# Patient Record
Sex: FEMALE | Race: WHITE
[De-identification: names, ages, dates, MRNs, and addresses within clinical notes are randomized per-mention and may not be internally consistent; named-entity substitution may affect disease eponyms.]

---

## 2020-09-01 ENCOUNTER — HOSPITAL ENCOUNTER (EMERGENCY)
Dept: HOSPITAL 97 - ER | Age: 55
Discharge: HOME | End: 2020-09-01
Payer: SELF-PAY

## 2020-09-01 DIAGNOSIS — I10: ICD-10-CM

## 2020-09-01 DIAGNOSIS — K08.89: Primary | ICD-10-CM

## 2020-09-01 DIAGNOSIS — F17.210: ICD-10-CM

## 2020-09-01 DIAGNOSIS — Z88.2: ICD-10-CM

## 2020-09-01 DIAGNOSIS — E03.9: ICD-10-CM

## 2020-09-01 DIAGNOSIS — Z88.0: ICD-10-CM

## 2020-09-01 LAB
BUN BLD-MCNC: 11 MG/DL (ref 7–18)
GLUCOSE SERPLBLD-MCNC: 112 MG/DL (ref 74–106)
HCT VFR BLD CALC: 31.4 % (ref 36–45)
LYMPHOCYTES # SPEC AUTO: 2 K/UL (ref 0.7–4.9)
PMV BLD: 7.8 FL (ref 7.6–11.3)
POTASSIUM SERPL-SCNC: 3.6 MMOL/L (ref 3.5–5.1)
RBC # BLD: 3.84 M/UL (ref 3.86–4.86)

## 2020-09-01 PROCEDURE — 96361 HYDRATE IV INFUSION ADD-ON: CPT

## 2020-09-01 PROCEDURE — 96375 TX/PRO/DX INJ NEW DRUG ADDON: CPT

## 2020-09-01 PROCEDURE — 80048 BASIC METABOLIC PNL TOTAL CA: CPT

## 2020-09-01 PROCEDURE — 99284 EMERGENCY DEPT VISIT MOD MDM: CPT

## 2020-09-01 PROCEDURE — 85025 COMPLETE CBC W/AUTO DIFF WBC: CPT

## 2020-09-01 PROCEDURE — 96374 THER/PROPH/DIAG INJ IV PUSH: CPT

## 2020-09-01 PROCEDURE — 36415 COLL VENOUS BLD VENIPUNCTURE: CPT

## 2020-09-01 NOTE — EDPHYS
Physician Documentation                                                                           

 [facility]                                                                 

Name: Leanne Canseco                                                                               

Age: 55 yrs                                                                                       

Sex: Female                                                                                       

: 1965                                                                                   

MRN: [id number]                                                                                   

Arrival Date: 2020                                                                          

Time: 01:12                                                                                       

Account#: [id number]                                                                            

Bed 13                                                                                            

Private MD:                                                                                       

ED Physician Brody Landry                                                                             

HPI:                                                                                              

                                                                                             

01:37 This 55 yrs old Female presents to ER via Ambulatory with complaints of Toothache,      pkl 

      Altered Mental Status.                                                                      

01:37 The patient presents with pain, swelling. Onset: The symptoms/episode began/occurred 2  pkl 

      day(s) ago. Patient had dental extraction 4 days ago. Given Tylenol #3 and Clindamycin.     

      Now out of pain medication.                                                                 

                                                                                                  

OB/GYN:                                                                                           

01:25 LMP N/A -                                                                               mt2 

                                                                                                  

Historical:                                                                                       

- Allergies:                                                                                      

01:23 Penicillins;                                                                            mt2 

01:23 Sulfa (Sulfonamide Antibiotics);                                                        mt2 

- Home Meds:                                                                                      

01:49 lisinopril 20 mg oral tab for Hypertension [Active]; propranolol 10 mg Oral tab 1 tab 3 mt2 

      times per day for Hypertension [Active]; levothyroxine 75 mcg tab 1 tab once daily for      

      Hypothyroidism [Active];                                                                    

- PMHx:                                                                                           

01:49 Hypertension; Hypothyroidism;                                                           mt2 

                                                                                                  

- Immunization history:: Adult Immunizations up to date.                                          

- Social history:: Smoking status: Patient reports the use of cigarette tobacco                   

  products, denies chronic smoking, but will smoke occasionally, Patient/guardian                 

  denies using alcohol, street drugs.                                                             

                                                                                                  

                                                                                                  

ROS:                                                                                              

01:37 Eyes: Negative for injury, pain, redness, and discharge.                                pkl 

01:37 ENT: Positive for dental pain.                                                              

01:37 Neck: Negative for stiffness.                                                               

01:37 Cardiovascular: Negative for chest pain.                                                    

01:37 Respiratory: Negative for cough, shortness of breath.                                       

01:37 Abdomen/GI: Negative for abdominal pain, nausea, vomiting, and diarrhea.                    

01:37 Back: Negative for acute changes.                                                           

01:37 : Negative for urinary symptoms.                                                          

01:37 MS/extremity: Negative for acute changes.                                                   

01:37 Skin: Negative for rash.                                                                    

01:37 Neuro: Negative for loss of consciousness.                                                  

                                                                                                  

Exam:                                                                                             

01:37 Eyes:  Pupils equal round and reactive to light, extra-ocular motions intact.  Lids and pkl 

      lashes normal.  Conjunctiva and sclera are non-icteric and not injected.  Cornea within     

      normal limits.  Periorbital areas with no swelling, redness, or edema.                      

01:37 Head/face: Noted is swelling, that is mild, of the  left  jaw.                              

01:37 Neck: Exam negative for nuchal rigidity.                                                    

01:37 Chest/axilla: Exam negative for acute changes.                                              

01:37 Cardiovascular: Rate: tachycardic, actual rate is  111 bpm, Rhythm: regular.                

01:37 Respiratory: the patient does not display signs of respiratory distress,  Respirations:     

      normal, Breath sounds: are clear throughout.                                                

01:37 Abdomen/GI: Exam negative for acute changes.                                                

01:37 Back: Exam negative for acute changes.                                                      

01:37 : Exam negative for acute changes.                                                        

01:37 Musculoskeletal/extremity: Exam is negative for acute changes.                              

01:37 Skin: Exam negative for rash.                                                               

01:37 Neuro: Orientation: is normal, Mentation: is normal, Cranial nerves: grossly normal,        

      Motor: is normal.                                                                           

                                                                                                  

Vital Signs:                                                                                      

01:19  / 134; Pulse 111; Resp 19; Temp 98.5; Pulse Ox 99% ; Weight 63.5 kg; Pain 10/10; mt2 

01:50  / 86; Pulse 96; Resp 16; Pulse Ox 100% on R/A; Pain 10/10;                       mt2 

02:22  / 86; Pulse 82; Resp 16; Pulse Ox 97% on R/A; Pain 5/10;                         mt2 

                                                                                                  

MDM:                                                                                              

01:16 Patient medically screened.                                                             pkl 

02:33 Data reviewed: vital signs, nurses notes, lab test result(s). ED course: Discussed lab. pkl 

      results with patient. Advised to follow up with Dentist tomorrow. Continue Clindamycin.     

      Patient understood instruction.                                                             

                                                                                                  

                                                                                             

01:34 Order name: CBC with Diff; Complete Time: 02:27                                         pkl 

                                                                                             

01:34 Order name: Chem 7; Complete Time: 02:27                                                pkl 

                                                                                                  

Administered Medications:                                                                         

01:36 Not Given (Patient Refused): Lopressor 5 mg IVP once; Hold for SBP <100 or HR <60.      pkl 

01:46 Drug: morphine 4 mg Route: IVP; Site: right antecubital;                                mt2 

02:05 Follow up: Response: No adverse reaction; Pain is decreased                             mt2 

01:46 Drug: Zofran (Ondansetron) 4 mg Route: IVP; Site: right antecubital;                    mt2 

02:04 Follow up: Response: No adverse reaction; Nausea is decreased                           mt2 

01:55 Drug: NS 0.9% 1000 ml Route: IV; Rate: 125 ml/hr; Site: right antecubital;              mt2 

02:51 Follow up: IV Status: Completed infusion; IV Intake: 400ml                              mt2 

02:40 Drug: morphine 4 mg Route: IVP; Site: right antecubital;                                mt2 

02:50 Follow up: Response: No adverse reaction; Pain is decreased                             mt2 

02:40 Drug: Viscous Lidocaine Liquid (4 %) 5 ml Route: Mucous Membrane;                       mt2 

02:50 Follow up: Response: No adverse reaction; Medication administered at discharge.         mt2 

                                                                                                  

                                                                                                  

Disposition:                                                                                      

20 02:37 Discharged to Home. Impression: Dental pain. S/P tooth extraction. Possible dry    

  socket.                                                                                         

- Condition is Stable.                                                                            

                                                                                                  

- Prescriptions for Tylenol- Codeine #3 300-30 mg Oral Tablet - take 1 tablet by ORAL             

  route every 8 hours As needed; 12 tablet.                                                       

- Medication Reconciliation Form, Thank You Letter, Antibiotic Education, Prescription            

  Opioid Use, Work release form form.                                                             

- Follow up: Private Physician; When: Tomorrow; Reason: Re-evaluation by your physician.          

- Problem is new.                                                                                 

- Symptoms have improved.                                                                         

                                                                                                  

                                                                                                  

                                                                                                  

Signatures:                                                                                       

Dispatcher [employer]                           EDMS                                                 

Brody Landry MD MD   pkDia Mario RN                    RN   mt2                                                  

                                                                                                  

Corrections: (The following items were deleted from the chart)                                    

02:52 02:37 2020 02:37 Discharged to Home. Impression: Dental pain. S/P tooth           mt2 

      extraction. Possible dry socket. Condition is Stable. Forms are Medication                  

      Reconciliation Form, Thank You Letter, Antibiotic Education, Prescription Opioid Use.       

      Follow up: Private Physician; When: Tomorrow; Reason: Re-evaluation by your physician.      

      Problem is new. Symptoms have improved. pkl                                                 

                                                                                                  

**************************************************************************************************

## 2020-09-01 NOTE — ER
Nurse's Notes                                                                                     

 [facility] [place] [facility]\""                                                                 

Name: Leanne Canseco                                                                               

Age: 55 yrs                                                                                       

Sex: Female                                                                                       

: 1965                                                                                   

MRN: [id number]                                                                                   

Arrival Date: 2020                                                                          

Time: 01:12                                                                                       

Account#: [id number]                                                                            

Bed 13                                                                                            

Private MD:                                                                                       

Diagnosis: Dental pain. S/P tooth extraction. Possible dry socket                                 

                                                                                                  

Presentation:                                                                                     

                                                                                             

01:19 Chief complaint: Patient states: PER PT AND DAUGHTER HAD TEETH EXTRACTION LAST WEEK.    mt2 

      WAS GIVEN TYL #3 WHICH SHE FINISHED PAIN HAS INCREASED SINCE THEN ALONG WITH ANXIETY.       

      PT RECOVERING ADDICT. WAS IN jail UNTIL 8 WEEKS AGO. DENIES SI OR HI. STATES "THE         

      PAIN IS MAKING ME CRAZY I CANT STAND IT ANYMORE". Coronavirus screen: At this time, the     

      client does not indicate any symptoms associated with coronavirus-19. Ebola Screen: No      

      symptoms or risks identified at this time. Initial Sepsis Screen: Does the patient meet     

      any 2 criteria? No. Patient's initial sepsis screen is negative. Does the patient have      

      a suspected source of infection? No. Patient's initial sepsis screen is negative. Risk      

      Assessment: Do you want to hurt yourself or someone else? Patient reports no desire to      

      harm self or others. Onset of symptoms was 2020.                                 

01:19 Method Of Arrival: Ambulatory                                                           mt2 

01:19 Acuity: GIA 3                                                                           mt2 

                                                                                                  

Triage Assessment:                                                                                

01:24 General: Appears distressed, Behavior is anxious, crying. Pain: Complains of pain in    mt2 

      mouth Pain currently is 10 out of 10 on a pain scale. EENT: Reports pain in mouth.          

      Neuro: No deficits noted. Cardiovascular: Rhythm is sinus tachycardia. Respiratory: No      

      deficits noted. GI: No deficits noted. : No deficits noted. Derm: No deficits noted.      

      Musculoskeletal: No deficits noted.                                                         

                                                                                                  

OB/GYN:                                                                                           

01:25 LMP N/A -                                                                               mt2 

                                                                                                  

Historical:                                                                                       

- Allergies:                                                                                      

01:23 Penicillins;                                                                            mt2 

01:23 Sulfa (Sulfonamide Antibiotics);                                                        mt2 

- Home Meds:                                                                                      

01:49 lisinopril 20 mg oral tab for Hypertension [Active]; propranolol 10 mg Oral tab 1 tab 3 mt2 

      times per day for Hypertension [Active]; levothyroxine 75 mcg tab 1 tab once daily for      

      Hypothyroidism [Active];                                                                    

- PMHx:                                                                                           

01:49 Hypertension; Hypothyroidism;                                                           mt2 

                                                                                                  

- Immunization history:: Adult Immunizations up to date.                                          

- Social history:: Smoking status: Patient reports the use of cigarette tobacco                   

  products, denies chronic smoking, but will smoke occasionally, Patient/guardian                 

  denies using alcohol, street drugs.                                                             

                                                                                                  

                                                                                                  

Screenin:24 Abuse screen: Denies threats or abuse. Nutritional screening: No deficits noted.        mt2 

      Tuberculosis screening: No symptoms or risk factors identified. Fall Risk None              

      identified.                                                                                 

                                                                                                  

Assessment:                                                                                       

02:21 Reassessment: Patient and/or family updated on plan of care and expected duration. Pain mt2 

      level reassessed. Patient is alert, oriented x 3, equal unlabored respirations, skin        

      warm/dry/pink. General: Appears comfortable, Behavior is calm. Pain: Complains of pain      

      in mouth Pain currently is 5 out of 10 on a pain scale. at worst was 10 out of 10 on a      

      pain scale.                                                                                 

                                                                                                  

Vital Signs:                                                                                      

01:19  / 134; Pulse 111; Resp 19; Temp 98.5; Pulse Ox 99% ; Weight 63.5 kg; Pain 10/10; mt2 

01:50  / 86; Pulse 96; Resp 16; Pulse Ox 100% on R/A; Pain 10/10;                       mt2 

02:22  / 86; Pulse 82; Resp 16; Pulse Ox 97% on R/A; Pain 5/10;                         mt2 

                                                                                                  

ED Course:                                                                                        

01:12 Patient arrived in ED.                                                                  ag3 

01:15 Brody Landry MD is Attending Physician.                                                    pkl 

01:19 Dia Callaway, RN is Primary Nurse.                                                  mt2 

01:22 Triage completed.                                                                       mt2 

01:23 Arm band placed on right wrist.                                                         mt2 

01:24 Patient has correct armband on for positive identification. Bed in low position. Call   [facility] 

      light in reach. Side rails up X 1.                                                          

01:36 Initial lab(s) drawn, by me, sent to lab. Inserted saline lock: 20 gauge in right       mt2 

      antecubital area, using aseptic technique. Blood collected.                                 

02:51 No provider procedures requiring assistance completed. IV discontinued, intact,         mt2 

      bleeding controlled, No redness/swelling at site. Pressure dressing applied.                

                                                                                                  

Administered Medications:                                                                         

01:36 Not Given (Patient Refused): Lopressor 5 mg IVP once; Hold for SBP <100 or HR <60.      pkl 

01:46 Drug: morphine 4 mg Route: IVP; Site: right antecubital;                                mt2 

02:05 Follow up: Response: No adverse reaction; Pain is decreased                             mt2 

01:46 Drug: Zofran (Ondansetron) 4 mg Route: IVP; Site: right antecubital;                    mt2 

02:04 Follow up: Response: No adverse reaction; Nausea is decreased                           mt2 

01:55 Drug: NS 0.9% 1000 ml Route: IV; Rate: 125 ml/hr; Site: right antecubital;              mt2 

02:51 Follow up: IV Status: Completed infusion; IV Intake: 400ml                              mt2 

02:40 Drug: morphine 4 mg Route: IVP; Site: right antecubital;                                mt2 

02:50 Follow up: Response: No adverse reaction; Pain is decreased                             mt2 

02:40 Drug: Viscous Lidocaine Liquid (4 %) 5 ml Route: Mucous Membrane;                       mt2 

02:50 Follow up: Response: No adverse reaction; Medication administered at discharge.         mt2 

                                                                                                  

                                                                                                  

Intake:                                                                                           

02:51 IV: 400ml; Total: 400ml.                                                                mt2 

                                                                                                  

Outcome:                                                                                          

02:37 Discharge ordered by MD. lopez 

02:51 Discharged to home                                                                      mt2 

02:51 Discharged to home ambulatory.                                                              

02:51 Condition: good                                                                             

02:51 Discharge instructions given to patient, family, Instructed on discharge instructions,      

      follow up and referral plans. medication usage, F/U WITH DENTIST Demonstrated               

      understanding of instructions, follow-up care, medications, Prescriptions given X 1.        

02:52 Patient left the ED.                                                                    mt2 

                                                                                                  

Signatures:                                                                                       

Brody Landry MD MD pkl Gomez, Alice                                 ag3                                                  

Dia Callaway RN                    RN   mt2                                                  

                                                                                                  

**************************************************************************************************

## 2020-09-02 ENCOUNTER — HOSPITAL ENCOUNTER (EMERGENCY)
Dept: HOSPITAL 97 - ER | Age: 55
Discharge: HOME | End: 2020-09-02
Payer: SELF-PAY

## 2020-09-02 DIAGNOSIS — Z88.2: ICD-10-CM

## 2020-09-02 DIAGNOSIS — Z88.0: ICD-10-CM

## 2020-09-02 DIAGNOSIS — K08.89: Primary | ICD-10-CM

## 2020-09-02 PROCEDURE — 99283 EMERGENCY DEPT VISIT LOW MDM: CPT

## 2020-09-02 NOTE — ER
Nurse's Notes                                                                                     

 [facility]                                                                 

Name: Leanne Canseco                                                                               

Age: 55 yrs                                                                                       

Sex: Female                                                                                       

: 1965                                                                                   

MRN: [id number]                                                                                   

Arrival Date: 2020                                                                          

Time: 13:04                                                                                       

Account#: [id number]                                                                            

Bed 19                                                                                            

Private MD:                                                                                       

Diagnosis: Dental Pain                                                                            

                                                                                                  

Presentation:                                                                                     

                                                                                             

13:04 Chief complaint: Patient states: Left lower jaw pain for over 1 week. Had tooth         ll1 

      extracted last week. Tramadol, codeine, and ibuprofen aren't helping with the pain. In      

      formerly [facility] custody. Coronavirus screen: Client denies travel out of the [place] in the last 14       

      days. At this time, the client does not indicate any symptoms associated with               

      coronavirus-19. Ebola Screen: Patient denies travel to an Ebola-affected area in the 21     

      days before illness onset. Initial Sepsis Screen: Does the patient meet any 2 criteria?     

      No. Patient's initial sepsis screen is negative. Risk Assessment: Do you want to hurt       

      yourself or someone else? Patient reports no desire to harm self or others. Onset of        

      symptoms.                                                                                   

13:04 Method Of Arrival: EMS: [facility]                                                ll1 

13:04 Acuity: GIA 4                                                                           ll1 

14:34 Initial Sepsis Screen: Does the patient have a suspected source of infection? No.       ph  

      Patient's initial sepsis screen is negative.                                                

                                                                                                  

Historical:                                                                                       

- Allergies:                                                                                      

13:06 PENICILLINS;                                                                            ll1 

13:06 Sulfa (Sulfonamide Antibiotics);                                                        ll1 

- PMHx:                                                                                           

13:06 Hypertension; Hypothyroidism;                                                           ll1 

                                                                                                  

- Immunization history:: Flu vaccine status is unknown.                                           

- Social history:: Smoking status: Patient denies any tobacco usage or history of.                

  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 Patient/guardian denies using street           

  drugs, the patient reports quitting approximately 3 years ago.                                  

                                                                                                  

                                                                                                  

Screenin:41 Abuse screen: Denies threats or abuse. Denies injuries from another. Nutritional        ph  

      screening: No deficits noted. Tuberculosis screening: No symptoms or risk factors           

      identified. Fall Risk None identified.                                                      

                                                                                                  

Assessment:                                                                                       

13:40 General: Appears in no apparent distress. comfortable, slender, Behavior is calm,       ph  

      cooperative, appropriate for age, Denies fever, feeling ill. Pain: Complains of pain in     

      mouth, left lower jaw area. Neuro: Level of Consciousness is awake, alert, obeys            

      commands, Oriented to person, place, time, situation. Cardiovascular: Capillary refill      

      < 3 seconds in bilateral fingers Patient's skin is warm and dry. Respiratory: Airway is     

      patent Respiratory effort is even, unlabored, Respiratory pattern is regular,               

      symmetrical. GI: Reports nausea, Patient currently denies anorexia, diarrhea, vomiting.     

      Derm: Skin is intact, is healthy with good turgor, Skin is pink, warm \T\ dry.              

      Musculoskeletal: Circulation, motion, and sensation intact. Range of motion: intact in      

      all extremities.                                                                            

                                                                                                  

Vital Signs:                                                                                      

13:04  / 96; Pulse 79; Resp 18; Pulse Ox 100% ; Pain 10/10;                             ll1 

                                                                                                  

ED Course:                                                                                        

13:04 Patient arrived in ED.                                                                  ll1 

13:06 Triage completed.                                                                       ll1 

13:10 Arm band placed on Patient placed in an exam room, on a stretcher.                      ll1 

13:16 Jordin Rodriguez MD is Attending Physician.                                              kdr 

13:27 Pauline Hdez, RN is Primary Nurse.                                                    ph  

13:41 Patient has correct armband on for positive identification. Bed in low position. Call   ph  

      light in reach. Side rails up X 1. Pulse ox on. NIBP on.                                    

14:34 No provider procedures requiring assistance completed. Patient did not have IV access   ph  

      during this emergency room visit.                                                           

                                                                                                  

Administered Medications:                                                                         

No medications were administered                                                                  

                                                                                                  

                                                                                                  

Outcome:                                                                                          

14:07 Discharge ordered by MD.                                                                kdr 

14:34 Discharged to Law Enforcement                                                           ph  

14:34 Condition: good                                                                             

14:34 Discharge instructions given to patient, police, Instructed on discharge instructions,      

      follow up and referral plans. medication usage, Demonstrated understanding of               

      instructions, follow-up care, medications, Prescriptions given X 2.                         

14:34 Patient left the ED.                                                                    ph  

                                                                                                  

Signatures:                                                                                       

Jordin Rodriguez MD MD   kdr                                                  

Pauline Hdez, RN                      RN   ph                                                   

Derek Welch RN                       RN   ll1                                                  

                                                                                                  

**************************************************************************************************

## 2020-09-02 NOTE — EDPHYS
Physician Documentation                                                                           

 [facility]                                                                 

Name: Leanne Canseco                                                                               

Age: 55 yrs                                                                                       

Sex: Female                                                                                       

: 1965                                                                                   

MRN: [id number]                                                                                   

Arrival Date: 2020                                                                          

Time: 13:04                                                                                       

Account#: [id number]                                                                            

Bed 19                                                                                            

Private MD:                                                                                       

ED Physician Jordin Rodriguez                                                                       

HPI:                                                                                              

                                                                                             

08:02 This 55 yrs old  Female presents to ER via EMS with complaints of Dental       kdr 

      Injury.                                                                                     

08:02 The patient presents with The patient states that she had a tooth pulled last Saturday  kdr 

      and is continuing to have pain that is not controlled by the T#3. She claims to be on       

      abx. . The problem is located in the lower left first molar and lower left second           

      bicuspid. Onset: The symptoms/episode began/occurred gradually, 5 day(s) ago. Duration:     

      The symptoms are continuous, and are steadily getting worse. Modifying factors: The         

      symptoms are alleviated by nothing, the symptoms are aggravated by air, chewing, food.      

      Associated signs and symptoms: The patient has no apparent associated signs or              

      symptoms. Severity of symptoms: At their worst the symptoms were moderate, severe, just     

      prior to arrival, in the emergency department the symptoms are unchanged. The patient       

      has not experienced similar symptoms in the past. The patient has been recently seen by     

      a physician: The patient has been recently seen at the [facility] Emergency Department, .                                                              

                                                                                                  

Historical:                                                                                       

- Allergies:                                                                                      

                                                                                             

13:06 PENICILLINS;                                                                            ll1 

13:06 Sulfa (Sulfonamide Antibiotics);                                                        ll1 

- PMHx:                                                                                           

13:06 Hypertension; Hypothyroidism;                                                           ll1 

                                                                                                  

- Immunization history:: Flu vaccine status is unknown.                                           

- Social history:: Smoking status: Patient denies any tobacco usage or history of.                

  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 Patient/guardian denies using street           

  drugs, the patient reports quitting approximately 3 years ago.                                  

                                                                                                  

                                                                                                  

ROS:                                                                                              

                                                                                             

08:02 Constitutional: Negative for fever, chills, and weight loss, Eyes: Negative for injury, kdr 

      pain, redness, and discharge, Neck: Negative for injury, pain, and swelling,                

      Cardiovascular: Negative for chest pain, palpitations, and edema.                           

      ENT: Positive for dental pain.                                                              

                                                                                                  

Exam:                                                                                             

08:02 Constitutional:  This is a well developed, well nourished patient who is awake, alert,  kdr 

      and in no acute distress. Head/Face:  Normocephalic, atraumatic. Neck:  Trachea             

      midline, no thyromegaly or masses palpated, and no cervical lymphadenopathy.  Supple,       

      full range of motion without nuchal rigidity, or vertebral point tenderness.  No            

      Meningismus.                                                                                

08:02 ENT: Dental exam: dental caries, that is mild, gum swelling, specifically in the  lower     

      left second molar (#18), lower left first molar (#19) and lower left second bicuspid        

      (#20).                                                                                      

                                                                                                  

Vital Signs:                                                                                      

                                                                                             

13:04  / 96; Pulse 79; Resp 18; Pulse Ox 100% ; Pain 10/10;                             ll1 

                                                                                                  

MDM:                                                                                              

14:07 Patient medically screened.                                                             kdr 

                                                                                             

08:02 Data reviewed: vital signs, nurses notes. Counseling: I had a detailed discussion with  kdr 

      the patient and/or guardian regarding: the historical points, exam findings, and any        

      diagnostic results supporting the discharge/admit diagnosis, the need for outpatient        

      follow up. ED course: The was not an apparent emergent finding on exam. There was minor     

      swelling around the apparently extracted tooth site but no purulent drainage. Exterior      

      jaw did not appear appreciably swollen and though the patient expressed pain on             

      palpation, there was not any other evidence of swelling or abscess.                         

                                                                                                  

Administered Medications:                                                                         

No medications were administered                                                                  

                                                                                                  

                                                                                                  

Disposition:                                                                                      

20 14:07 Discharged to Home. Impression: Dental Pain.                                       

- Condition is Stable.                                                                            

- Discharge Instructions: Dental Pain, Easy-to-Read, Dental Extraction, Care After,               

  Easy-to-Read.                                                                                   

- Prescriptions for Clindamycin HCl 300 mg Oral Capsule - take 1 capsule by ORAL route            

  every 6 hours for 7 days; 28 capsule. Metronidazole 500 mg Oral Tablet - take 1                 

  tablet by ORAL route every 8 hours; 21 tablet.                                                  

- Medication Reconciliation Form, Thank You Letter, Antibiotic Education form.                    

- Follow up: Private Physician; When: 2 - 3 days; Reason: If symptoms return, Further             

  diagnostic work-up, Recheck today's complaints, Continuance of care, Re-evaluation by           

  your physician.                                                                                 

- Problem is an ongoing problem.                                                                  

- Symptoms are unchanged.                                                                         

                                                                                                  

                                                                                                  

                                                                                                  

Signatures:                                                                                       

Jordin Rodriguez MD MD   [facility]                                                  

Pauline Hdez RN                      RN   Derek Jones RN                       RN   ll1                                                  

                                                                                                  

Corrections: (The following items were deleted from the chart)                                    

                                                                                             

14:34 14:07 2020 14:07 Discharged to Home. Impression: Dental Pain. Condition is        ph  

      Stable. Forms are Medication Reconciliation Form, Thank You Letter, Antibiotic              

      Education, Prescription Opioid Use. Follow up: Private Physician; When: 2 - 3 days;         

      Reason: If symptoms return, Further diagnostic work-up, Recheck today's complaints,         

      Continuance of care, Re-evaluation by your physician. Problem is an ongoing problem.        

      Symptoms are unchanged. kdr                                                                 

                                                                                                  

**************************************************************************************************

## 2020-09-03 VITALS — OXYGEN SATURATION: 97 % | DIASTOLIC BLOOD PRESSURE: 86 MMHG | SYSTOLIC BLOOD PRESSURE: 133 MMHG

## 2020-09-03 VITALS — TEMPERATURE: 98.5 F

## 2020-09-04 VITALS — SYSTOLIC BLOOD PRESSURE: 153 MMHG | OXYGEN SATURATION: 100 % | DIASTOLIC BLOOD PRESSURE: 96 MMHG

## 2021-07-31 ENCOUNTER — HOSPITAL ENCOUNTER (EMERGENCY)
Dept: HOSPITAL 97 - ER | Age: 56
LOS: 1 days | Discharge: HOME | End: 2021-08-01
Payer: SELF-PAY

## 2021-07-31 DIAGNOSIS — E87.6: ICD-10-CM

## 2021-07-31 DIAGNOSIS — E03.9: ICD-10-CM

## 2021-07-31 DIAGNOSIS — T43.625A: ICD-10-CM

## 2021-07-31 DIAGNOSIS — F19.90: Primary | ICD-10-CM

## 2021-07-31 DIAGNOSIS — Z88.2: ICD-10-CM

## 2021-07-31 DIAGNOSIS — Z88.0: ICD-10-CM

## 2021-07-31 DIAGNOSIS — I10: ICD-10-CM

## 2021-07-31 PROCEDURE — 96374 THER/PROPH/DIAG INJ IV PUSH: CPT

## 2021-07-31 PROCEDURE — 80076 HEPATIC FUNCTION PANEL: CPT

## 2021-07-31 PROCEDURE — 85025 COMPLETE CBC W/AUTO DIFF WBC: CPT

## 2021-07-31 PROCEDURE — 80320 DRUG SCREEN QUANTALCOHOLS: CPT

## 2021-07-31 PROCEDURE — 99284 EMERGENCY DEPT VISIT MOD MDM: CPT

## 2021-07-31 PROCEDURE — 80048 BASIC METABOLIC PNL TOTAL CA: CPT

## 2021-07-31 PROCEDURE — 81003 URINALYSIS AUTO W/O SCOPE: CPT

## 2021-07-31 PROCEDURE — 80307 DRUG TEST PRSMV CHEM ANLYZR: CPT

## 2021-07-31 PROCEDURE — 96361 HYDRATE IV INFUSION ADD-ON: CPT

## 2021-07-31 PROCEDURE — 93005 ELECTROCARDIOGRAM TRACING: CPT

## 2021-07-31 PROCEDURE — 36415 COLL VENOUS BLD VENIPUNCTURE: CPT

## 2021-07-31 PROCEDURE — 80329 ANALGESICS NON-OPIOID 1 OR 2: CPT

## 2021-08-01 VITALS — SYSTOLIC BLOOD PRESSURE: 127 MMHG | DIASTOLIC BLOOD PRESSURE: 77 MMHG

## 2021-08-01 VITALS — OXYGEN SATURATION: 100 % | TEMPERATURE: 98.5 F

## 2021-08-01 LAB
ALBUMIN SERPL BCP-MCNC: 4.1 G/DL (ref 3.4–5)
ALP SERPL-CCNC: 62 U/L (ref 45–117)
ALT SERPL W P-5'-P-CCNC: 31 U/L (ref 12–78)
AST SERPL W P-5'-P-CCNC: 28 U/L (ref 15–37)
BUN BLD-MCNC: 10 MG/DL (ref 7–18)
GLUCOSE SERPLBLD-MCNC: 91 MG/DL (ref 74–106)
HCT VFR BLD CALC: 34.2 % (ref 36–45)
LYMPHOCYTES # SPEC AUTO: 2.8 K/UL (ref 0.7–4.9)
METHAMPHET UR QL SCN: POSITIVE
PMV BLD: 7.1 FL (ref 7.6–11.3)
POTASSIUM SERPL-SCNC: 2.9 MMOL/L (ref 3.5–5.1)
RBC # BLD: 4.24 M/UL (ref 3.86–4.86)
THC SERPL-MCNC: POSITIVE NG/ML

## 2021-08-01 NOTE — ER
Nurse's Notes                                                                                     

 [facility]                                                                 

Name: Leanne Canseco                                                                               

Age: 56 yrs                                                                                       

Sex: Female                                                                                       

: 1965                                                                                   

MRN: [id number]                                                                                   

Arrival Date: 2021                                                                          

Time: 21:27                                                                                       

Account#: [id number]                                                                            

Bed 18                                                                                            

Private MD:                                                                                       

Diagnosis: Visual hallucinations;Adverse effect of amphetamines;Essential (primary)               

  hypertension;Hypokalemia;Other psychoactive substance use, unspecified                          

                                                                                                  

Presentation:                                                                                     

                                                                                             

22:13 Chief complaint: Patient states: she has parasites coming out everywhere was told by    bb  

      her PCP she was having psychotic breakdown. Coronavirus screen: At this time, the           

      client does not indicate any symptoms associated with coronavirus-19. Ebola Screen: No      

      symptoms or risks identified at this time. Initial Sepsis Screen: Does the patient meet     

      any 2 criteria? No. Patient's initial sepsis screen is negative. Does the patient have      

      a suspected source of infection? No. Patient's initial sepsis screen is negative. Risk      

      Assessment: Do you want to hurt yourself or someone else? Patient reports no desire to      

      harm self or others. Onset of symptoms was 2021.                                   

22:13 Method Of Arrival: Ambulatory                                                           bb  

22:13 Acuity: GIA 3                                                                           bb  

                                                                                                  

Triage Assessment:                                                                                

22:16 General: Appears uncomfortable, Behavior is anxious. Pain: Complains of pain in back    bb  

      Pain currently is 9 out of 10 on a pain scale. Neuro: Level of Consciousness is awake,      

      alert, obeys commands, Oriented to person, place. Cardiovascular: Capillary refill < 3      

      seconds Patient's skin is warm and dry. Respiratory: Respiratory effort is even,            

      unlabored. GI: No signs and/or symptoms were reported involving the gastrointestinal        

      system. : Reports parasites coming out of her. Derm: Skin is pink, warm \T\ dry.          

      Musculoskeletal: Circulation, motion, and sensation intact.                                 

                                                                                                  

OB/GYN:                                                                                           

22:16 LMP 2021                                                                           bb  

                                                                                                  

Historical:                                                                                       

- Allergies:                                                                                      

22:14 PENICILLINS;                                                                            bb  

22:14 Sulfa (Sulfonamide Antibiotics);                                                        bb  

- Home Meds:                                                                                      

22:14 levothyroxine 75 mcg tab 1 tab once daily for Hypothyroidism [Active]; lisinopril 20 mg bb  

      Oral tab for Hypertension [Active]; propranolol 10 mg Oral tab 1 tab 3 times per day        

      for Hypertension [Active]; Trazodone Oral [Active]; Cymbalta oral [Active]; Seroquel        

      Oral [Active]; Hydroxyzine Pamoate Oral [Active];                                           

22:16 naltrexone oral [Active];                                                               bb  

- PMHx:                                                                                           

22:14 Hypertension; Hypothyroidism;                                                           bb  

                                                                                                  

- Immunization history:: Adult Immunizations up to date, Client reports having NOT                

  received the Covid vaccine.                                                                     

- Social history:: Smoking status: Patient denies any tobacco usage or history of.                

  Patient/guardian denies using alcohol, street drugs.                                            

                                                                                                  

                                                                                                  

Screenin/01                                                                                             

01:44 Abuse screen: Denies threats or abuse. Nutritional screening: No deficits noted.        em  

      Tuberculosis screening: No symptoms or risk factors identified. Fall Risk None              

      identified.                                                                                 

                                                                                                  

Assessment:                                                                                       

02:30 Reassessment: No changes from previously documented assessment. Patient is alert,       bb  

      oriented x 3, equal unlabored respirations, skin warm/dry/pink. see triage assessment.      

05:36 Reassessment: Patient is alert, oriented x 3, equal unlabored respirations, skin        bb  

      warm/dry/pink. pt verbalized understanding of and agrees to plan of care discharge          

      instructions given pt ambulated with steady gait to exit.                                   

                                                                                                  

Vital Signs:                                                                                      

                                                                                             

22:13  / 114; Pulse 110; Resp 18 S; Temp 98.8(TE); Pulse Ox 97% on R/A; Weight 63.5 kg  bb  

      (R); Height 5 ft. 2 in. (157.48 cm) (R); Pain 10/10;                                        

08                                                                                             

04:07  / 99; Pulse 76; Resp 18; Temp 98.5; Pulse Ox 100% ;                              ds4 

05:23  / 77; Pulse 83; Resp 16; Pulse Ox 100% ;                                         ds4 

                                                                                             

22:13 Body Mass Index 25.61 (63.50 kg, 157.48 cm)                                               

                                                                                                  

ED Course:                                                                                        

                                                                                             

21:27 Patient arrived in ED.                                                                  wm  

22:14 Triage completed.                                                                       bb  

22:16 Arm band placed on Patient placed in waiting room, Patient notified of wait time.       bb  

                                                                                             

01:37 Isael Roth MD is Attending Physician.                                             keith 

01:44 Danny Calabrese, RN is Primary Nurse.                                                      em  

01:44 Patient has correct armband on for positive identification.                             em  

02:30 No provider procedures requiring assistance completed. IV discontinued, intact,         bb  

      bleeding controlled, No redness/swelling at site. Pressure dressing applied.                

03:15 Inserted saline lock: 22 gauge in right antecubital area, using aseptic technique.      ds4 

      Blood collected.                                                                            

04:28 PO challenge completed successfully.                                                    ds4 

04:59 Waldo Nelson MD is Referral Physician.                                           keith 

                                                                                                  

Administered Medications:                                                                         

03:00 Drug: NS 0.9% 1000 ml Route: IV; Rate: 1 bolus; Site: right antecubital;                bb  

05:36 Follow up: IV Status: Order to discontinue infusion; IV Intake: 300ml                   bb  

03:00 Drug: Benadryl (diphenhydrAMINE) 25 mg Route: IVP; Site: right antecubital;             bb  

04:00 Follow up: Response: No adverse reaction                                                bb  

03:10 Drug: Tylenol 1000 mg Route: PO;                                                        bb  

04:00 Follow up: Response: No adverse reaction                                                bb  

04:54 Drug: Potassium Effervescent Tablet 50 mEq Route: PO;                                   bb  

05:35 Follow up: Response: No adverse reaction                                                bb  

                                                                                                  

                                                                                                  

Intake:                                                                                           

05:36 IV: 300ml; Total: 300ml.                                                                bb  

                                                                                                  

Outcome:                                                                                          

04:59 Discharge ordered by MD.                                                                keith 

05:37 Discharged to home ambulatory.                                                          bb  

05:37 Condition: stable                                                                           

05:37 Discharge instructions given to patient, Instructed on discharge instructions, follow       

      up and referral plans. medication usage, Demonstrated understanding of instructions,        

      follow-up care, medications, Prescriptions given X 2.                                       

05:37 Patient left the ED.                                                                    bb  

05:59 Patient left the ED.                                                                    bb  

                                                                                                  

Signatures:                                                                                       

Isael Roth MD MD cha Munoz, Edgar, RN                        RN   Sonia Kelsey RN                     RN   Keven Chavarria                             Kaitlin Sterling                                                                                    

                                                                                                  

**************************************************************************************************

## 2021-08-01 NOTE — EDPHYS
Physician Documentation                                                                           

 [facility]                                                                 

Name: Leanne Canseco                                                                               

Age: 56 yrs                                                                                       

Sex: Female                                                                                       

: 1965                                                                                   

MRN: [id number]                                                                                   

Arrival Date: 2021                                                                          

Time: 21:27                                                                                       

Account#: [id number]                                                                            

Bed 18                                                                                            

Private MD:                                                                                       

[facility] Physician Isael Roth                                                                      

HPI:                                                                                              

                                                                                             

02:35 This 56 yrs old  Female presents to ER via Ambulatory with complaints of Back  keith 

      Pain.                                                                                       

02:35 The patient presents with pain that is chronic.                                         keith 

                                                                                                  

OB/GYN:                                                                                           

                                                                                             

22:16 LMP 2021                                                                           bb  

                                                                                                  

Historical:                                                                                       

- Allergies:                                                                                      

22:14 PENICILLINS;                                                                            bb  

22:14 Sulfa (Sulfonamide Antibiotics);                                                        bb  

- Home Meds:                                                                                      

22:14 levothyroxine 75 mcg tab 1 tab once daily for Hypothyroidism [Active]; lisinopril 20 mg bb  

      Oral tab for Hypertension [Active]; propranolol 10 mg Oral tab 1 tab 3 times per day        

      for Hypertension [Active]; Trazodone Oral [Active]; Cymbalta oral [Active]; Seroquel        

      Oral [Active]; Hydroxyzine Pamoate Oral [Active];                                           

22:16 naltrexone oral [Active];                                                               bb  

- PMHx:                                                                                           

22:14 Hypertension; Hypothyroidism;                                                           bb  

                                                                                                  

- Immunization history:: Adult Immunizations up to date, Client reports having NOT                

  received the Covid vaccine.                                                                     

- Social history:: Smoking status: Patient denies any tobacco usage or history of.                

  Patient/guardian denies using alcohol, street drugs.                                            

                                                                                                  

                                                                                                  

ROS:                                                                                              

                                                                                             

02:35 Constitutional: Negative for fever, chills, and weight loss, Eyes: Negative for injury, keith 

      pain, redness, and discharge, ENT: Negative for injury, pain, and discharge, Neck:          

      Negative for injury, pain, and swelling, Cardiovascular: Negative for chest pain,           

      palpitations, and edema, Respiratory: Negative for shortness of breath, cough,              

      wheezing, and pleuritic chest pain, Abdomen/GI: Negative for abdominal pain, nausea,        

      vomiting, diarrhea, and constipation, Back: Negative for injury and pain, : Negative      

      for injury, bleeding, discharge, and swelling, MS/Extremity: Negative for injury and        

      deformity, Neuro: Negative for headache, weakness, numbness, tingling, and seizure,         

      Psych: Negative for depression, anxiety, suicide ideation, homicidal ideation, and          

      hallucinations, Allergy/Immunology: Negative for hives, rash, and allergies, Endocrine:     

      Negative for neck swelling, polydipsia, polyuria, polyphagia, and marked weight             

      changes, Hematologic/Lymphatic: Negative for swollen nodes, abnormal bleeding, and          

      unusual bruising.                                                                           

      Skin: Positive for rash, diffusely.                                                         

                                                                                                  

Exam:                                                                                             

02:35 Constitutional:  This is a well developed, well nourished patient who is awake, alert,  keith 

      and in no acute distress. Head/Face:  Normocephalic, atraumatic. Eyes:  Pupils equal        

      round and reactive to light, extra-ocular motions intact.  Lids and lashes normal.          

      Conjunctiva and sclera are non-icteric and not injected.  Cornea within normal limits.      

      Periorbital areas with no swelling, redness, or edema. ENT:  Nares patent. No nasal         

      discharge, no septal abnormalities noted.  Tympanic membranes are normal and external       

      auditory canals are clear.  Oropharynx with no redness, swelling, or masses, exudates,      

      or evidence of obstruction, uvula midline.  Mucous membranes moist. Neck:  Trachea          

      midline, no thyromegaly or masses palpated, and no cervical lymphadenopathy.  Supple,       

      full range of motion without nuchal rigidity, or vertebral point tenderness.  No            

      Meningismus. Chest/axilla:  Normal chest wall appearance and motion.  Nontender with no     

      deformity.  No lesions are appreciated. Cardiovascular:  Regular rate and rhythm with a     

      normal S1 and S2.  No gallops, murmurs, or rubs.  Normal PMI, no JVD.  No pulse             

      deficits. Respiratory:  Lungs have equal breath sounds bilaterally, clear to                

      auscultation and percussion.  No rales, rhonchi or wheezes noted.  No increased work of     

      breathing, no retractions or nasal flaring. Abdomen/GI:  Soft, non-tender, with normal      

      bowel sounds.  No distension or tympany.  No guarding or rebound.  No evidence of           

      tenderness throughout. Back:  No spinal tenderness.  No costovertebral tenderness.          

      Full range of motion. Skin:  Warm, dry with normal turgor.  Normal color with no            

      rashes, no lesions, and no evidence of cellulitis. MS/ Extremity:  Pulses equal, no         

      cyanosis.  Neurovascular intact.  Full, normal range of motion. Neuro:  Awake and           

      alert, GCS 15, oriented to person, place, time, and situation.  Cranial nerves II-XII       

      grossly intact.  Motor strength 5/5 in all extremities.  Sensory grossly intact.            

      Cerebellar exam normal.  Normal gait.                                                       

02:35 Psych: Behavior/mood is anxious, Affect is animated, Oriented to person, place, time,       

      Patient has no thoughts/intents to harm self or others. Judgement / Insight is              

      impaired. Memory is normal. Delusions/hallucinations are present and described as sees      

      parasites all over her body.                                                                

03:34 ECG was reviewed by the Attending Physician.                                            [facility] 

                                                                                                  

Vital Signs:                                                                                      

                                                                                             

22:13  / 114; Pulse 110; Resp 18 S; Temp 98.8(TE); Pulse Ox 97% on R/A; Weight 63.5 kg  bb  

      (R); Height 5 ft. 2 in. (157.48 cm) (R); Pain 10/10;                                        

                                                                                             

04:07  / 99; Pulse 76; Resp 18; Temp 98.5; Pulse Ox 100% ;                              ds4 

05:23  / 77; Pulse 83; Resp 16; Pulse Ox 100% ;                                         ds4 

                                                                                             

22:13 Body Mass Index 25.61 (63.50 kg, 157.48 cm)                                               

                                                                                                  

MDM:                                                                                              

01:37 Patient medically screened.                                                             [facility] 

02:38 Differential diagnosis: sprain. Differential Diagnosis altered mental status. Data      keith 

      reviewed: vital signs, nurses notes, lab test result(s). Data interpreted: Cardiac          

      monitor: rate is 110 beats/min, rhythm is regular, Pulse oximetry: on room air is 110       

      %. Test interpretation: by ED physician or midlevel provider:. Counseling: I had a          

      detailed discussion with the patient and/or guardian regarding: the historical points,      

      exam findings, and any diagnostic results supporting the discharge/admit diagnosis, lab     

      results, radiology results, the need for outpatient follow up, for definitive care, a       

      family practitioner, a psychiatrist.                                                        

                                                                                                  

                                                                                             

01:36 Order name: Acetaminophen                                                               [facility] 

                                                                                             

01:36 Order name: Basic Metabolic Panel                                                       [facility] 

                                                                                             

01:36 Order name: CBC with Diff; Complete Time: 04:23                                         keith 

                                                                                             

01:36 Order name: ETOH Level; Complete Time: 04:23                                            keith 

                                                                                             

01:36 Order name: Hepatic Function; Complete Time: 04:23                                      keith 

                                                                                             

01:36 Order name: Urine Drug Screen; Complete Time: 04:58                                     keith 

                                                                                             

01:37 Order name: Acetaminophen Level; Complete Time: 04:23                                   [facility]

                                                                                             

01:37 Order name: Basic Metabolic Panel; Complete Time: 04:23                                 [facility]

                                                                                             

04:03 Order name: Urine Dipstick-Ancillary; Complete Time: 04:23                              [facility]

                                                                                             

01:36 Order name: EKG; Complete Time: 01:37                                                   [facility] 

                                                                                             

01:36 Order name: EKG - Nurse/Tech; Complete Time: 03:20                                      [facility] 

                                                                                             

01:36 Order name: IV Saline Lock; Complete Time: 03:21                                        [facility] 

                                                                                             

01:36 Order name: Labs collected and sent; Complete Time: 03:20                               [facility] 

                                                                                             

01:36 Order name: Suicide Screening ([place]); Complete Time: 01:44                          [facility] 

                                                                                             

01:36 Order name: Urine Dipstick-Ancillary (obtain specimen); Complete Time: 04:07            [facility] 

                                                                                             

04:24 Order name: PO challenge: juice; Complete Time: 04:28                                   [facility] 

                                                                                                  

EC:34 Rate is 85 beats/min. Rhythm is regular. QRS Axis is Normal. AK interval is normal. QRS keith 

      interval is normal. QT interval is normal. No Q waves. T waves are Normal. No ST            

      changes noted. Interpreted by me. Reviewed by me.                                           

                                                                                                  

Administered Medications:                                                                         

03:00 Drug: NS 0.9% 1000 ml Route: IV; Rate: 1 bolus; Site: right antecubital;                bb  

05:36 Follow up: IV Status: Order to discontinue infusion; IV Intake: 300ml                   bb  

03:00 Drug: Benadryl (diphenhydrAMINE) 25 mg Route: IVP; Site: right antecubital;             bb  

04:00 Follow up: Response: No adverse reaction                                                bb  

03:10 Drug: Tylenol 1000 mg Route: PO;                                                        bb  

04:00 Follow up: Response: No adverse reaction                                                bb  

04:54 Drug: Potassium Effervescent Tablet 50 mEq Route: PO;                                   bb  

05:35 Follow up: Response: No adverse reaction                                                bb  

                                                                                                  

                                                                                                  

Disposition Summary:                                                                              

21 04:59                                                                                    

Discharge Ordered                                                                                 

      Location: Home                                                                          keith 

      Problem: new                                                                            keith 

      Symptoms: have improved                                                                 keith 

      Condition: Stable                                                                       keith 

      Diagnosis                                                                                   

        - Visual hallucinations                                                               keith 

        - Adverse effect of amphetamines                                                      keith 

        - Essential (primary) hypertension                                                    keith 

        - Hypokalemia                                                                         keith 

        - Other psychoactive substance use, unspecified                                       keith 

      Followup:                                                                               keith 

        - With: Private Physician                                                                  

        - When: 2 - 3 days                                                                         

        - Reason: Recheck today's complaints, Continuance of care, Re-evaluation by your           

      physician                                                                                   

      Followup:                                                                               keith 

        - With:                                                                                    

        - When: 2 - 3 days                                                                         

        - Reason: Recheck today's complaints, Re-evaluation by your physician                      

      Discharge Instructions:                                                                     

        - Discharge Summary Sheet                                                             keith 

        - Amphetamines Use Disorder                                                           keith 

        - Hypertension, Adult                                                                 keith 

        - Hypertension, Adult, Easy-to-Read                                                   keith 

        - How to Take Your Blood Pressure, Easy-to-Read                                       keith 

        - Potassium Content of Foods                                                          keith 

        - Aspirin and Your Heart                                                              keith 

        - Managing Your Hypertension                                                          keith 

        - Hypokalemia                                                                         keith 

      Forms:                                                                                      

        - Medication Reconciliation Form                                                      keith 

        - Thank You Letter                                                                    keith 

        - Antibiotic Education                                                                keith 

        - Prescription Opioid Use                                                             keith 

        - Work release form                                                                   bb  

      Prescriptions:                                                                              

        - diphenhydramine HCl 25 mg Oral tablet                                                    

            - take 1 tablet by ORAL route 4 times per day; 26 tablet; Refills: 0, Product     keith 

      Selection Permitted                                                                         

        - Doxycycline Hyclate 100 mg Oral Tablet                                                   

            - take 1 tablet by ORAL route every 12 hours; 20 tablet; Refills: 0, Product      keith 

      Selection Permitted                                                                         

Signatures:                                                                                       

Dispatcher [employer]                           Isael Chan MD MD cha Ballard, Brenda, RN                     RN   bb                                                   

                                                                                                  

**************************************************************************************************

## 2022-02-27 ENCOUNTER — HOSPITAL ENCOUNTER (EMERGENCY)
Dept: HOSPITAL 97 - ER | Age: 57
LOS: 1 days | Discharge: HOME | End: 2022-02-28
Payer: SELF-PAY

## 2022-02-27 DIAGNOSIS — F15.10: ICD-10-CM

## 2022-02-27 DIAGNOSIS — N39.0: ICD-10-CM

## 2022-02-27 DIAGNOSIS — F12.151: Primary | ICD-10-CM

## 2022-02-27 DIAGNOSIS — I10: ICD-10-CM

## 2022-02-27 DIAGNOSIS — Z88.0: ICD-10-CM

## 2022-02-27 DIAGNOSIS — Z88.2: ICD-10-CM

## 2022-02-27 DIAGNOSIS — F41.9: ICD-10-CM

## 2022-02-27 LAB
ALBUMIN SERPL BCP-MCNC: 3.5 G/DL (ref 3.4–5)
ALP SERPL-CCNC: 77 U/L (ref 45–117)
ALT SERPL W P-5'-P-CCNC: 22 U/L (ref 12–78)
AST SERPL W P-5'-P-CCNC: 16 U/L (ref 15–37)
BUN BLD-MCNC: 17 MG/DL (ref 7–18)
GLUCOSE SERPLBLD-MCNC: 84 MG/DL (ref 74–106)
HCT VFR BLD CALC: 36.7 % (ref 36–45)
INR BLD: 0.9
LYMPHOCYTES # SPEC AUTO: 2.4 K/UL (ref 0.7–4.9)
METHAMPHET UR QL SCN: POSITIVE
PMV BLD: 7.3 FL (ref 7.6–11.3)
POTASSIUM SERPL-SCNC: 3.2 MMOL/L (ref 3.5–5.1)
RBC # BLD: 4.43 M/UL (ref 3.86–4.86)
SP GR UR: 1.01 (ref 1–1.03)
THC SERPL-MCNC: POSITIVE NG/ML

## 2022-02-27 PROCEDURE — 81025 URINE PREGNANCY TEST: CPT

## 2022-02-27 PROCEDURE — 36415 COLL VENOUS BLD VENIPUNCTURE: CPT

## 2022-02-27 PROCEDURE — 93005 ELECTROCARDIOGRAM TRACING: CPT

## 2022-02-27 PROCEDURE — 85610 PROTHROMBIN TIME: CPT

## 2022-02-27 PROCEDURE — 80329 ANALGESICS NON-OPIOID 1 OR 2: CPT

## 2022-02-27 PROCEDURE — 80307 DRUG TEST PRSMV CHEM ANLYZR: CPT

## 2022-02-27 PROCEDURE — 80076 HEPATIC FUNCTION PANEL: CPT

## 2022-02-27 PROCEDURE — 80048 BASIC METABOLIC PNL TOTAL CA: CPT

## 2022-02-27 PROCEDURE — 85025 COMPLETE CBC W/AUTO DIFF WBC: CPT

## 2022-02-27 PROCEDURE — 85730 THROMBOPLASTIN TIME PARTIAL: CPT

## 2022-02-27 PROCEDURE — 99284 EMERGENCY DEPT VISIT MOD MDM: CPT

## 2022-02-27 PROCEDURE — 80320 DRUG SCREEN QUANTALCOHOLS: CPT

## 2022-02-27 PROCEDURE — 81003 URINALYSIS AUTO W/O SCOPE: CPT

## 2022-02-27 NOTE — EDPHYS
Physician Documentation                                                                           

 [facility]                                                                 

Name: Leanne Canseco                                                                               

Age: 56 yrs                                                                                       

Sex: Female                                                                                       

: 1965                                                                                   

MRN: [id number]                                                                                   

Arrival Date: 2022                                                                          

Time: 21:30                                                                                       

Account#: [id number]                                                                            

Bed 23                                                                                            

Private MD:                                                                                       

ED Physician Melquiades Ford                                                                      

HPI:                                                                                              

                                                                                             

21:58 This 56 yrs old Female presents to ER via EMS with complaints of Altered Mental Status. mh7 

21:58 The patient presents to the emergency department with psychosis, has experienced visual mh7 

      hallucinations, seeing people who are not actually present. Onset: The symptoms/episode     

      began/occurred today. Past psychiatric history: Prior diagnosis: bipolar disorder,          

      Anxiety, Psychiatric medications include: Wellbutrin, Seroquel, Gabapentin, Primary         

      psychiatric physician: the patient has not had a prior suicide gesture, the patient         

      does not have a previous inpatient psychiatric history, the patient's last psychiatric      

      treatment was none. Associated signs and symptoms: Pertinent positives; hallucinations,     

      substance abuse, Pertinent negatives: abdominal pain, anxiety, chest pain, chills,          

      delusions, depression, fever, headache, homicidal ideation, nausea, night sweats,           

      palpitations, paranoia, shortness of breath, suicide ideation, tremor, vomiting.            

      Severity of symptoms: At their worst the symptoms were moderate today, in the emergency     

      department the symptoms have improved markedly.                                             

                                                                                                  

Historical:                                                                                       

- Allergies:                                                                                      

21:35 PENICILLINS;                                                                            lp1 

21:35 Sulfa (Sulfonamide Antibiotics);                                                        lp1 

- Home Meds:                                                                                      

21:35 Seroquel Oral nightly [Active]; buspirone Oral [Active]; gabapentin oral [Active];      lp1 

- PMHx:                                                                                           

21:35 Hypertension; Hypothyroidism; Anxiety;                                                  lp1 

- PSHx:                                                                                           

21:35 None;                                                                                   lp1 

                                                                                                  

- Immunization history:: Adult Immunizations unknown.                                             

- Social history:: Smoking status: Patient denies any tobacco usage or history of.                

  Patient uses street drugs, marijuana.                                                           

                                                                                                  

                                                                                                  

ROS:                                                                                              

21:58 Constitutional: Negative for fever, chills, and weight loss, Eyes: Negative for injury, mh7 

      pain, redness, and discharge, ENT: Negative for injury, pain, and discharge, Neck:          

      Negative for injury, pain, and swelling, Cardiovascular: Negative for chest pain,           

      palpitations, and edema, Respiratory: Negative for shortness of breath, cough,              

      wheezing, and pleuritic chest pain, Abdomen/GI: Negative for abdominal pain, nausea,        

      vomiting, diarrhea, and constipation, Back: Negative for injury and pain, : Negative      

      for injury, bleeding, discharge, and swelling, MS/Extremity: Negative for injury and        

      deformity, Skin: Negative for injury, rash, and discoloration, Neuro: Negative for          

      headache, weakness, numbness, tingling, and seizure, Allergy/Immunology: Negative for       

      hives, rash, and allergies, Endocrine: Negative for neck swelling, polydipsia,              

      polyuria, polyphagia, and marked weight changes, Hematologic/Lymphatic: Negative for        

      swollen nodes, abnormal bleeding, and unusual bruising.                                     

                                                                                                  

Exam:                                                                                             

21:58 Head/Face:  Normocephalic, atraumatic. Eyes:  Pupils equal round and reactive to light, mh7 

      extra-ocular motions intact.  Lids and lashes normal.  Conjunctiva and sclera are           

      non-icteric and not injected.  Cornea within normal limits.  Periorbital areas with no      

      swelling, redness, or edema. Neck:  Trachea midline, no thyromegaly or masses palpated,     

      and no cervical lymphadenopathy.  Supple, full range of motion without nuchal rigidity,     

      or vertebral point tenderness.  No Meningismus. Chest/axilla:  Normal chest wall            

      appearance and motion.  Nontender with no deformity.  No lesions are appreciated.           

      Cardiovascular:  Regular rate and rhythm with a normal S1 and S2.  No gallops, murmurs,     

      or rubs.  Normal PMI, no JVD.  No pulse deficits. Respiratory:  Lungs have equal breath     

      sounds bilaterally, clear to auscultation and percussion.  No rales, rhonchi or wheezes     

      noted.  No increased work of breathing, no retractions or nasal flaring. Abdomen/GI:        

      Soft, non-tender, with normal bowel sounds.  No distension or tympany.  No guarding or      

      rebound.  No evidence of tenderness throughout. Back:  No spinal tenderness.  No            

      costovertebral tenderness.  Full range of motion. Skin:  Warm, dry with normal turgor.      

      Normal color with no rashes, no lesions, and no evidence of cellulitis. MS/ Extremity:      

      Pulses equal, no cyanosis.  Neurovascular intact.  Full, normal range of motion.            

21:58 Constitutional: The patient appears in no acute distress, drowsy but easily arousable       

21:58 Neuro: Orientation: is normal, Mentation: is normal, Memory: is normal, Cranial nerves:     

      grossly normal, Cerebellar function: is grossly normal, Motor: is normal, Sensation: is     

      normal, Gait: is steady, at a normal pace, without difficulty, seizure activity, is not     

      displayed by the patient, Abnormal movements: there are no abnormal movements.              

21:58 Psych: Behavior/mood is cooperative, Affect is calm, Oriented to person, place, time,       

      Patient has no thoughts/intents to harm self or others. Judgement / Insight is normal.      

      Memory is normal. Delusions/hallucinations are not present.                                 

22:30 ECG was reviewed by the Attending Physician.                                            [facility] 

                                                                                                  

Vital Signs:                                                                                      

21:32  / 90 (man/reg); Pulse 79; Resp 14; Temp 96.9(T); Pulse Ox 100% on R/A;           sf1 

21:37 Weight 58.97 kg (R); Height 5 ft. 2 in. (157.48 cm);                                    lp1 

23:45  / 90; Pulse 73; Resp 16; Pulse Ox 100% on R/A;                                   ss7 

21:37 Body Mass Index 23.78 (58.97 kg, 157.48 cm)                                             lp1 

                                                                                                  

MDM:                                                                                              

23:19 Differential diagnosis: drug withdrawal. acute psychotic break, psychosis secondary to  mh7 

      non-compliance. Data reviewed: vital signs, nurses notes, EMS record, old medical           

      records, lab test result(s), CBC, drug level(s), acetaminophen, alcohol, salicylate,        

      electrolytes, urinalysis, urine drug screen, EKG. Data interpreted: Pulse oximetry: on      

      room air is 100 %. Interpretation: normal. Counseling: I had a detailed discussion with     

      the patient and/or guardian regarding: the historical points, exam findings, and any        

      diagnostic results supporting the discharge/admit diagnosis, the presence of at least       

      one elevated blood pressure reading (>120/80) during this emergency department visit,       

      lab results, the need for outpatient follow up, to return to the emergency department       

      if symptoms worsen or persist or if there are any questions or concerns that arise at       

      home. Response to treatment: the patient's symptoms have resolved after treatment, the      

      patient's blood pressure is in an acceptable range, mental status has returned to           

      baseline, the patient no longer shows bradycardia, the patient is not short of breath,      

      the patient is not tachycardic, the patient's pain is gone, the patient's temperature       

      has normalized.                                                                             

23:23 Patient medically screened.                                                             [facility] 

                                                                                                  

                                                                                             

21:46 Order name: Acetaminophen; Complete Time: 22:55                                         [facility] 

                                                                                             

21:46 Order name: Basic Metabolic Panel; Complete Time: 22:55                                 [facility] 

                                                                                             

21:46 Order name: CBC with Diff; Complete Time: 22:55                                         [facility] 

                                                                                             

21:46 Order name: ETOH Level; Complete Time: 22:55                                            [facility] 

                                                                                             

21:46 Order name: Hepatic Function; Complete Time: 22:55                                      [facility] 

                                                                                             

21:46 Order name: PT-INR; Complete Time: 22:55                                                [facility] 

                                                                                             

21:46 Order name: Ptt, Activated; Complete Time: 22:55                                        [facility] 

                                                                                             

21:46 Order name: Salicylate; Complete Time: 22:55                                            [facility] 

                                                                                             

21:46 Order name: Urine Drug Screen; Complete Time: 22:55                                     [facility] 

                                                                                             

22:16 Order name: Urine Dipstick-Ancillary; Complete Time: 22:55                              [facility]

                                                                                             

22:42 Order name: Urine Pregnancy--Ancillary (enter results)                                  [facility] 

                                                                                             

22:42 Order name: Urine Pregnancy--Ancillary                                                  [facility]

                                                                                             

21:46 Order name: EKG; Complete Time: 21:46                                                   [facility] 

                                                                                             

21:46 Order name: EKG - Nurse/Tech; Complete Time: 22:31                                      [facility] 

                                                                                             

21:46 Order name: IV Saline Lock; Complete Time: 22:15                                        [facility] 

                                                                                             

21:46 Order name: Labs collected and sent; Complete Time: 22:15                               [facility] 

                                                                                             

21:46 Order name: Suicide Screening ([place]); Complete Time: 22:15                          [facility] 

                                                                                             

21:46 Order name: Urine Dipstick-Ancillary (obtain specimen); Complete Time: 22:34            [facility] 

                                                                                             

21:46 Order name: Urine Pregnancy Test (obtain specimen); Complete Time: 23:06                [facility] 

                                                                                                  

EC:30 Rate is 58 beats/min. Rhythm is regular, Sinus bradycardia with No ectopy. QRS Axis is  7 

      Normal. ID interval is normal. QRS interval is normal. QT interval is normal. No Q          

      waves. T waves are Normal. No ST changes noted. Clinical impression: Normal ECG.            

                                                                                                  

Administered Medications:                                                                         

23:24 Drug: NS 0.9% 1000 ml Route: IV; Rate: 1000 ml; Site: left antecubital;                 lr4 

23:25 Follow up: IV Status: Infusion continued                                                lr4 

23:24 Drug: Potassium Effervescent Tablet 50 mEq Route: PO;                                   lr4 

23:24 Follow up: Response: No adverse reaction                                                lr4 

23:27 Drug: Cipro (ciprofloxacin) 500 mg Route: PO;                                           lr4 

                                                                                                  

                                                                                                  

Disposition Summary:                                                                              

22 23:23                                                                                    

Discharge Ordered                                                                                 

      Location: Home                                                                          [facility] 

      Problem: an acute exacerbation                                                          [facility] 

      Symptoms: have improved                                                                 [facility] 

      Condition: Stable                                                                       [facility] 

      Diagnosis                                                                                   

        - Visual hallucinations                                                               7 

        - Methamphetamine Abuse                                                               7 

        - Cannabis abuse with psychotic disorder with hallucinations                          7 

        - UTI/ Urinary tract infection, site not specified                                    [facility] 

      Followup:                                                                               [facility] 

        - With: Private Physician                                                                  

        - When: 1 - 2 days                                                                         

        - Reason: Worsening of condition, Recheck today's complaints, Continuance of care,         

      Re-evaluation by your physician                                                             

      Followup:                                                                               [facility] 

        - With: Waldo Nelson MD                                                             

        - When: 1 - 2 days                                                                         

        - Reason: Worsening of condition, Recheck today's complaints                               

      Discharge Instructions:                                                                     

        - Discharge Summary Sheet                                                             [facility] 

        - Cannabis Use Disorder                                                               [facility] 

        - Substance Use Disorder                                                              [facility] 

        - Urinary Tract Infection, Adult, Easy-to-Read                                        [facility] 

        - Methamphetamines Use Disorder                                                       [facility] 

        - Substance Use Disorder and Mental Illness                                           [facility] 

      Forms:                                                                                      

        - Medication Reconciliation Form                                                      [facility] 

        - Thank You Letter                                                                    [facility] 

        - Antibiotic Education                                                                [facility] 

        - Prescription Opioid Use                                                             [facility] 

      Prescriptions:                                                                              

        - Cipro 500 mg Oral Tablet                                                                 

            - take 1 tablet by ORAL route every 12 hours for 7 days; 14 tablet; Refills: 0,   7 

      Product Selection Permitted                                                                 

Signatures:                                                                                       

Dispatcher [employer]                           EDDonna Guillen RN                         RN   lp1                                                  

Melquiades Ford MD MD   7                                                  

Samantha Gamble RN                        RN   ss7                                                  

Minnie Jefferson RN                   RN   lr4                                                  

                                                                                                  

**************************************************************************************************

## 2022-02-27 NOTE — XMS REPORT
Continuity of Care Document

                          Created on:2022



Patient:NATHALIA REYNOSO

Sex:Female

:1965

External Reference #:[id number]





Demographics







                          Address                   [address]



                                                    [address]



                                                    [place] [address]

 

                          Home Phone                [phone]

 

                          Work Phone                [phone]

 

                          Email Address             NONE

 

                          Preferred Language        Unknown

 

                          Marital Status            Unknown

 

                          Zoroastrianism Affiliation     Unknown

 

                          Race                      Unknown

 

                          Additional Race(s)        Unavailable

 

                          Ethnic Group              Unknown









Author







                          Organization              [facility]

t

 

                          Address                   [address]



                                                    [place] [address]

 

                          Phone                     [phone]









Care Team Providers







                    Name                Role                Phone

 

                    Unavailable         Unavailable         Unavailable









Problems

This patient has no known problems.



Allergies, Adverse Reactions, Alerts







       Allergy Allergy Status Severity Reaction(s) Onset  Inactive Treating Comm

ents 

Source



       Name   Type                        Date   Date   Clinician        

 

       ERYTHROM DRUG   Active        Hives  2018                      [facility]



       YCIN                               2-10                        ity of



                                          00:00:                      [address]



                                                                      Branch

 

       METRONID DRUG   Active        Hives  2018                      [facility]



       AZOLE  INGREDI                      2-10                        ity of



       HCL                                00:00:                      [address]

 

       PENICILL Drug   Active        Hives  2018                      [facility]



       INS    Class                       2-10                        ity of



                                          00:00:                      [address]



                                                                      Branch

 

       SULFA  Drug   Active        Hives  2018                      [facility]



       (SULFONA Class                       2-10                        ity of



       MIDE                               00:00:                      [place]



       ANTIBIOT                             [address] [place] [address])                                                           Branch







Medications

This patient has no known medications.



Procedures

This patient has no known procedures.



Encounters







        Start   End     Encounter Admission Attending Care    Care    Encounter 

Source



        Date/Time Date/Time Type    Type    Clinicians Facility Department ID   

   

 

        2021         Emergency                 [facility]    [id number] 

[facility]



        10:22:12                                                         ity of



                                                                        [facility]







Results

This patient has no known results.

## 2022-02-27 NOTE — ER
Nurse's Notes                                                                                     

 [facility]                                                                 

Name: Leanne Canseco                                                                               

Age: 56 yrs                                                                                       

Sex: Female                                                                                       

: 1965                                                                                   

MRN: [id number]                                                                                   

Arrival Date: 2022                                                                          

Time: 21:30                                                                                       

Account#: [id number]                                                                            

Bed 23                                                                                            

Private MD:                                                                                       

Diagnosis: Visual hallucinations;Methamphetamine Abuse;Cannabis abuse with psychotic disorder with

  hallucinations;UTI/ Urinary tract infection, site not specified                                 

                                                                                                  

Presentation:                                                                                     

                                                                                             

21:15 Chief complaint: EMS states: Called for patient found unresponsive in car outside of    1 

      workplace, awake on arrival of EMS, unable to recall what happened; Per patient's           

      manager, and patient, hallucinating at work; States similar occurrence with mouth           

      infection, patient currently has sores in mouth.                                            

21:15 Coronavirus screen: At this time, the client does not indicate any symptoms associated  lp1 

      with coronavirus-19. Ebola Screen: No symptoms or risks identified at this time. Risk       

      Assessment: Do you want to hurt yourself or someone else? Patient reports no desire to      

      harm self or others. Note Patient is employee of [employer] reports "I was seeing          

      children sitting at tables that weren't actually there"; Reports starting shift at 1230     

      today. Onset of symptoms was 2022.                                             

21:15 Method Of Arrival: EMS: [facility]                                                1 

21:15 Acuity: GIA 2                                                                           lp1 

23:55 Initial Sepsis Screen: Does the patient meet any 2 criteria? No. Patient's initial      ss7 

      sepsis screen is negative. Does the patient have a suspected source of infection? No.       

      Patient's initial sepsis screen is negative.                                                

                                                                                                  

Historical:                                                                                       

- Allergies:                                                                                      

21:35 PENICILLINS;                                                                            lp1 

21:35 Sulfa (Sulfonamide Antibiotics);                                                        lp1 

- Home Meds:                                                                                      

21:35 Seroquel Oral nightly [Active]; buspirone Oral [Active]; gabapentin oral [Active];      lp1 

- PMHx:                                                                                           

21:35 Hypertension; Hypothyroidism; Anxiety;                                                  lp1 

- PSHx:                                                                                           

21:35 None;                                                                                   lp1 

                                                                                                  

- Immunization history:: Adult Immunizations unknown.                                             

- Social history:: Smoking status: Patient denies any tobacco usage or history of.                

  Patient uses street drugs, marijuana.                                                           

                                                                                                  

                                                                                                  

Screenin:32 Abuse screen: Denies threats or abuse. Nutritional screening: No deficits noted.        ss7 

      Tuberculosis screening: No symptoms or risk factors identified. Fall Risk IV access (20     

      points). Gait- Weak (10 pts.). Mental Status- Overestimates/Forgets Limitations (15         

      pts.).                                                                                      

                                                                                                  

Assessment:                                                                                       

21:31 General: Appears in no apparent distress. Behavior is drowsy. Pain: Denies pain. Neuro: ss7 

      Level of Consciousness is lethargic, Oriented to person, place, time, situation,       

      are equal bilaterally Moves all extremities. Speech is slurred, Facial symmetry appears     

      normal, Pupils are Intact Reports. Cardiovascular: Heart tones S1 S2. Respiratory:          

      Breath sounds are clear bilaterally. GI: Bowel sounds present X 4 quads. : No             

      deficits noted. EENT: No deficits noted. Derm: No deficits noted. Musculoskeletal: No       

      deficits noted.                                                                             

                                                                                                  

Vital Signs:                                                                                      

21:32  / 90 (man/reg); Pulse 79; Resp 14; Temp 96.9(T); Pulse Ox 100% on R/A;           sf1 

21:37 Weight 58.97 kg (R); Height 5 ft. 2 in. (157.48 cm);                                    lp1 

23:45  / 90; Pulse 73; Resp 16; Pulse Ox 100% on R/A;                                   ss7 

21:37 Body Mass Index 23.78 (58.97 kg, 157.48 cm)                                             lp1 

                                                                                                  

ED Course:                                                                                        

21:30 Patient arrived in ED.                                                                  lp1 

21:31 Samantha Gamble, RN is Primary Nurse.                                                      ss7 

21:32 Melquiades Ford MD is Attending Physician.                                             7 

21:32 Patient has correct armband on for positive identification. Placed in gown. Bed in low  ss7 

      position. Call light in reach. Side rails up X2.                                            

21:32 Inserted saline lock: 20 gauge in right antecubital area, using aseptic technique.      ss7 

21:35 Triage completed.                                                                       lp1 

22:15 Acetaminophen Sent.                                                                     ss7 

22:15 CBC with Diff Sent.                                                                     ss7 

22:16 ETOH Level Sent.                                                                        ss7 

22:16 Hepatic Function Sent.                                                                  ss7 

22:16 PT-INR Sent.                                                                            ss7 

22:16 Ptt, Activated Sent.                                                                    ss7 

22:16 Salicylate Sent.                                                                        ss7 

22:16 Urine Drug Screen Sent.                                                                 ss7 

22:33 Urine Drug Screen Sent.                                                                 ss7 

22:33 ETOH Level Sent.                                                                        ss7 

22:33 Basic Metabolic Panel Sent.                                                             ss7 

22:33 Acetaminophen Sent.                                                                     ss7 

23:06 Urine Pregnancy--Ancillary (enter results) Sent.                                        lr4 

23:21 Waldo Nelson MD is Referral Physician.                                           [facility] 

23:54 No provider procedures requiring assistance completed.                                  7 

23:55 Arm band placed on.                                                                     7 

                                                                                             

00:01 Report given to Ana.                                                               [facility] 

00:56 IV discontinued, intact, bleeding controlled, No redness/swelling at site. Pressure     sf1 

      dressing applied.                                                                           

                                                                                                  

Administered Medications:                                                                         

                                                                                             

23:24 Drug: NS 0.9% 1000 ml Route: IV; Rate: 1000 ml; Site: left antecubital;                 lr4 

23:25 Follow up: IV Status: Infusion continued                                                lr4 

23:24 Drug: Potassium Effervescent Tablet 50 mEq Route: PO;                                   lr4 

23:24 Follow up: Response: No adverse reaction                                                lr4 

23:27 Drug: Cipro (ciprofloxacin) 500 mg Route: PO;                                           lr4 

                                                                                                  

                                                                                                  

Outcome:                                                                                          

23:23 Discharge ordered by MD.                                                                [facility] 

                                                                                             

00:56 Discharged to home via wheelchair, with family.                                         sf1 

      Condition: good                                                                             

      Discharge instructions given to patient, Instructed on discharge instructions, follow       

      up and referral plans. Demonstrated understanding of instructions, follow-up care,          

      medications, Prescriptions given X 1.                                                       

00:56 Patient left the ED.                                                                    sf1 

                                                                                                  

Signatures:                                                                                       

Donna Cardenas RN                         RN   1                                                  

Melquiades Ford MD MD   [facility]                                                  

Ana Andre RN RN   sf1                                                  

Samantha Gamble RN RN   7                                                  

Minnie Jefferson RN                   RN   lr4                                                  

                                                                                                  

**************************************************************************************************

## 2022-02-28 VITALS — OXYGEN SATURATION: 100 % | TEMPERATURE: 96.9 F

## 2022-02-28 VITALS — DIASTOLIC BLOOD PRESSURE: 90 MMHG | SYSTOLIC BLOOD PRESSURE: 129 MMHG

## 2022-02-28 NOTE — EKG
Test Date:    2022-02-27               Test Time:    22:25:15

Technician:   MERI                                     

                                                     

MEASUREMENT RESULTS:                                       

Intervals:                                           

Rate:         58                                     

NC:           112                                    

QRSD:         94                                     

QT:           446                                    

QTc:          437                                    

Axis:                                                

P:            45                                     

NC:           112                                    

QRS:          87                                     

T:            48                                     

                                                     

INTERPRETIVE STATEMENTS:                                       

                                                     

Sinus bradycardia

Otherwise normal ECG

Compared to ECG 08/01/2021 03:27:24

Sinus rhythm no longer present

Right-axis deviation no longer present



Electronically Signed On 02-28-22 08:47:36 CST by Chris Chopra

## 2022-03-30 ENCOUNTER — HOSPITAL ENCOUNTER (EMERGENCY)
Dept: HOSPITAL 97 - ER | Age: 57
Discharge: HOME | End: 2022-03-30
Payer: SELF-PAY

## 2022-03-30 VITALS — TEMPERATURE: 97.6 F

## 2022-03-30 VITALS — DIASTOLIC BLOOD PRESSURE: 99 MMHG | OXYGEN SATURATION: 98 % | SYSTOLIC BLOOD PRESSURE: 124 MMHG

## 2022-03-30 DIAGNOSIS — Z88.2: ICD-10-CM

## 2022-03-30 DIAGNOSIS — F41.9: ICD-10-CM

## 2022-03-30 DIAGNOSIS — Z88.0: ICD-10-CM

## 2022-03-30 DIAGNOSIS — G43.009: Primary | ICD-10-CM

## 2022-03-30 DIAGNOSIS — I10: ICD-10-CM

## 2022-03-30 DIAGNOSIS — E03.9: ICD-10-CM

## 2022-03-30 LAB
BUN BLD-MCNC: 7 MG/DL (ref 7–18)
GLUCOSE SERPLBLD-MCNC: 95 MG/DL (ref 74–106)
HCT VFR BLD CALC: 34 % (ref 36–45)
LYMPHOCYTES # SPEC AUTO: 2.1 K/UL (ref 0.7–4.9)
PMV BLD: 6.7 FL (ref 7.6–11.3)
POTASSIUM SERPL-SCNC: 3.5 MMOL/L (ref 3.5–5.1)
RBC # BLD: 4.15 M/UL (ref 3.86–4.86)

## 2022-03-30 PROCEDURE — 80048 BASIC METABOLIC PNL TOTAL CA: CPT

## 2022-03-30 PROCEDURE — 96375 TX/PRO/DX INJ NEW DRUG ADDON: CPT

## 2022-03-30 PROCEDURE — 99284 EMERGENCY DEPT VISIT MOD MDM: CPT

## 2022-03-30 PROCEDURE — 70450 CT HEAD/BRAIN W/O DYE: CPT

## 2022-03-30 PROCEDURE — 36415 COLL VENOUS BLD VENIPUNCTURE: CPT

## 2022-03-30 PROCEDURE — 96374 THER/PROPH/DIAG INJ IV PUSH: CPT

## 2022-03-30 PROCEDURE — 81003 URINALYSIS AUTO W/O SCOPE: CPT

## 2022-03-30 PROCEDURE — 85025 COMPLETE CBC W/AUTO DIFF WBC: CPT

## 2022-03-30 PROCEDURE — 72125 CT NECK SPINE W/O DYE: CPT

## 2022-03-30 NOTE — XMS REPORT
Continuity of Care Document

                            Created on:2022



Patient:NATHALIA REYNOSO

Sex:Female

:1965

External Reference #:[id number]





Demographics







                          Address                   [address]



                                                    [address]



                                                    [place] [address]

 

                          Home Phone                [phone]

 

                          Work Phone                [phone]

 

                          Email Address             NONE

 

                          Preferred Language        Unknown

 

                          Marital Status            Unknown

 

                          Evangelical Affiliation     Unknown

 

                          Race                      Unknown

 

                          Additional Race(s)        Unavailable

 

                          Ethnic Group              Unknown









Author







                          Organization              [facility]

t

 

                          Address                   [address]



                                                    [place] [address]

 

                          Phone                     [phone]









Care Team Providers







                    Name                Role                Phone

 

                    Unavailable         Unavailable         Unavailable









Problems

This patient has no known problems.



Allergies, Adverse Reactions, Alerts







       Allergy Allergy Status Severity Reaction(s) Onset  Inactive Treating Comm

ents 

Source



       Name   Type                        Date   Date   Clinician        

 

       ERYTHROM DRUG   Active        Hives  2018                      [facility]



       YCIN                               2-10                        ity of



                                          00:00:                      [address]



                                                                      Branch

 

       METRONID DRUG   Active        Hives  2018                      [facility]



       AZOLE  INGREDI                      2-10                        ity of



       HCL                                00:00:                      [address]

 

       PENICILL Drug   Active        Hives  2018                      [facility]



       INS    Class                       2-10                        ity of



                                          00:00:                      [address]



                                                                      Branch

 

       SULFA  Drug   Active        Hives  2018                      [facility]



       (SULFONA Class                       2-10                        ity of



       MIDE                               00:00:                      [place]



       ANTIBIOT                             [address] [place] [address])                                                           Branch







Medications

This patient has no known medications.



Procedures

This patient has no known procedures.



Encounters







        Start   End     Encounter Admission Attending Care    Care    Encounter 

Source



        Date/Time Date/Time Type    Type    Clinicians Facility Department ID   

   

 

        2021         Emergency                 [facility]    [id number] 

[facility]



        10:22:12                                                         ity of



                                                                        [facility]







Results

This patient has no known results.

## 2022-03-30 NOTE — EDPHYS
Physician Documentation                                                                           

 [facility]                                                                 

Name: Leanne Canseco                                                                               

Age: 56 yrs                                                                                       

Sex: Female                                                                                       

: 1965                                                                                   

MRN: [id number]                                                                                   

Arrival Date: 2022                                                                          

Time: 00:35                                                                                       

Account#: [id number]                                                                            

Bed 5                                                                                             

Private MD:                                                                                       

[facility] Physician Isael Roth                                                                      

HPI:                                                                                              

                                                                                             

01:18 This 56 yrs old Female presents to ER via Ambulatory with complaints of Headache, Neck  jr8 

      Pain, >24Hrs Old.                                                                           

01:18 Severity of symptoms: At its worst the pain was moderate, in the emergency department   jr8 

      the pain is unchanged. Headache History: The patient has had previous headaches and         

      this one is different than previous episodes. The symptoms are alleviated by nothing.       

      the symptoms are aggravated by lights, movement, noise. The patient has not experienced     

      similar symptoms in the past. The patient has been recently seen by a physician:. This      

      is a 56-year-old female patient that presented to the emergency room with complaints of     

      migraine headache that radiates down to her neck. Has had it for several days and           

      continues to happen without relief. Currently out of her migraine medicine at this          

      time. Went and saw local health clinic today who evaluated her and also mention that        

      she needs to be evaluated for possible DVT as she has intermittent swelling in her          

      lower extremities. Patient stated that her legs feel fine at this moment and is more        

      concerned about her head. Stated that she gets dizzy with it as well.                       

                                                                                                  

OB/GYN:                                                                                           

00:51 LMP 3/23/2022                                                                           lg3 

                                                                                                  

Historical:                                                                                       

- Allergies:                                                                                      

00:51 PENICILLINS;                                                                            lg3 

00:51 Sulfa (Sulfonamide Antibiotics);                                                        lg3 

- Home Meds:                                                                                      

00:51 Buspirone Oral [Active]; gabapentin Oral [Active]; Seroquel Oral nightly [Active];      lg3 

      Lisinopril Oral [Active]; Propranolol Oral [Active]; Synthroid Oral [Active];               

      Dicyclomine Oral [Active]; Methocarbamol Oral [Active];                                     

- PMHx:                                                                                           

00:51 Anxiety; Hypertension; Hypothyroidism;                                                  lg3 

- PSHx:                                                                                           

00:51 None;                                                                                   lg3 

                                                                                                  

- Immunization history:: Adult Immunizations up to date, Client reports having NOT                

  received the Covid vaccine.                                                                     

- Social history:: Smoking status: Patient denies any tobacco usage or history of.                

  Patient uses alcohol, occasionally.                                                             

                                                                                                  

                                                                                                  

ROS:                                                                                              

01:18 Eyes: Negative for injury, pain, redness, and discharge, ENT: Negative for injury,      jr8 

      pain, and discharge, Neck: Negative for injury, pain, and swelling, Cardiovascular:         

      Negative for chest pain, palpitations, and edema, Respiratory: Negative for shortness       

      of breath, cough, wheezing, and pleuritic chest pain, Abdomen/GI: Negative for              

      abdominal pain, nausea, vomiting, diarrhea, and constipation, Back: Negative for injury     

      and pain, Skin: Negative for injury, rash, and discoloration.                               

01:18 MS/extremity: Positive for swelling.                                                        

01:18 Neuro: Positive for dizziness, headache, Negative for altered mental status, gait           

      disturbance, hearing loss, numbness, seizure activity, speech changes, syncope,             

      tingling, tinnitus, tremor, visual changes, weakness.                                       

                                                                                                  

Exam:                                                                                             

01:18 Constitutional:  This is a well developed, well nourished patient who is awake, alert,  jr8 

      and in no acute distress. ENT:  Nares patent. No nasal discharge, no septal                 

      abnormalities noted.  Tympanic membranes are normal and external auditory canals are        

      clear.  Oropharynx with no redness, swelling, or masses, exudates, or evidence of           

      obstruction, uvula midline.  Mucous membranes moist. Neck:  Trachea midline, no             

      thyromegaly or masses palpated, and no cervical lymphadenopathy.  Supple, full range of     

      motion without nuchal rigidity, or vertebral point tenderness.  No Meningismus.             

      Cardiovascular:  Regular rate and rhythm with a normal S1 and S2.  No gallops, murmurs,     

      or rubs.  Normal PMI, no JVD.  No pulse deficits. Respiratory:  Lungs have equal breath     

      sounds bilaterally, clear to auscultation and percussion.  No rales, rhonchi or wheezes     

      noted.  No increased work of breathing, no retractions or nasal flaring. Abdomen/GI:        

      Soft, non-tender, with normal bowel sounds.  No distension or tympany.  No guarding or      

      rebound.  No evidence of tenderness throughout. Back:  No spinal tenderness.  No            

      costovertebral tenderness.  Full range of motion. Skin:  Warm, dry with normal turgor.      

      Normal color with no rashes, no lesions, and no evidence of cellulitis. MS/ Extremity:      

      Pulses equal, no cyanosis.  Neurovascular intact.  Full, normal range of motion.  Both      

      lower extremities without calf tenderness, swelling, pain, erythema.  Equal in              

      circumference. Neuro:  Awake and alert, GCS 15, oriented to person, place, time, and        

      situation.  Cranial nerves II-XII grossly intact.  Motor strength 5/5 in all                

      extremities.  Sensory grossly intact.  Cerebellar exam normal.  Normal gait.                

04:36 Neck: ROM/movement: is normal, no acute changes, pain, is not appreciated, limited      keith 

      range of motion, is not appreciated, Meningeal signs: are not present, Kernig's sign is     

      negative, Brudzinski's sign is negative.                                                    

04:37 Neck: Thyroid: appears normal, no acute changes, Trachea: is midline with no obvious    keith 

      abnormalities, no acute changes, Lymph nodes: no appreciated lymphadenopathy.               

04:37 Musculoskeletal/extremity: DVT Exam: No signs of deep vein thrombosis. no pain, no          

      swelling, no tenderness, negative Homans' sign noted on exam, no appreciated bluish         

      discoloration, no erythema, no increased warmth.                                            

                                                                                                  

Vital Signs:                                                                                      

00:43  / 93; Pulse 101; Resp 18 S; Temp 97.6(O); Pulse Ox 100% on R/A; Weight 61.23 kg  lg3 

      (R); Height 5 ft. 2 in. (157.48 cm) (R); Pain 10/10;                                        

04:34  / 99; Pulse 100; Resp 18 S; Pulse Ox 98% on R/A;                                 bb  

00:43 Body Mass Index 24.69 (61.23 kg, 157.48 cm)                                             lg3 

                                                                                                  

[place] Coma Score:                                                                               

04:35 Eye Response: spontaneous(4). Verbal Response: oriented(5). Motor Response: obeys       keith 

      commands(6). Total: 15.                                                                     

                                                                                                  

MDM:                                                                                              

01:00 Patient medically screened.                                                             jr8 

02:36 Data reviewed: vital signs, nurses notes, lab test result(s), radiologic studies, CT    jr8 

      scan. Data interpreted: Pulse oximetry: on room air is 100 %. Interpretation: normal.       

      Counseling: I had a detailed discussion with the patient and/or guardian regarding: the     

      historical points, exam findings, and any diagnostic results supporting the                 

      discharge/admit diagnosis, lab results, radiology results, the need for outpatient          

      follow up, a family practitioner, to return to the emergency department if symptoms         

      worsen or persist or if there are any questions or concerns that arise at home.             

      Response to treatment: the patient's symptoms have markedly improved after treatment.       

02:39 ED course: Patient overall feeling much better. Will discharge home to follow-up with   davy 

      her local health clinic. Knows to come back if she were to worsening point time..           

04:35 Differential diagnosis: cluster headache, subarachnoid bleed, subdural hematoma,        keith 

      temporal arteritis, tension headache, trigeminal neuralgia, vasomotor headache.             

                                                                                                  

                                                                                             

01:13 Order name: CBC with Diff; Complete Time: 02:35                                         8 

                                                                                             

01:13 Order name: Basic Metabolic Panel; Complete Time: 02:35                                 jr8 

                                                                                             

01:13 Order name: CT Head C Spine                                                             [facility] 

                                                                                             

04:56 Order name: Urine Dipstick-Ancillary; Complete Time: 16:05                              EDMS

                                                                                             

01:13 Order name: IV; Complete Time: 02:06                                                    8 

                                                                                             

04:33 Order name: Urine Dipstick-Ancillary (obtain specimen); Complete Time: 04:55            keith 

                                                                                                  

Administered Medications:                                                                         

05:03 Discontinued: NS 0.9% 1000 ml IV at 1 bolus Per protocol; 1000 mL bolus                 bb  

02:16 Drug: Reglan (metoCLOPramide) 10 mg Route: IVP; Site: right antecubital;                bb  

03:18 Follow up: Response: Marked relief of symptoms                                          bb  

02:16 Drug: Benadryl (diphenhydrAMINE) 25 mg Route: IVP; Site: right antecubital;             bb  

03:18 Follow up: Response: Marked relief of symptoms                                          bb  

04:35 Drug: NS 0.9% 1000 ml Route: IV; Rate: 1 bolus; Site: right antecubital;                bb  

04:35 Drug: Zofran (Ondansetron) 4 mg Route: IVP; Site: right antecubital;                    bb  

05:03 Follow up: Response: No adverse reaction                                                bb  

04:37 Drug: Ketorolac 30 mg Route: IVP; Site: right antecubital;                              bb  

05:03 Follow up: Response: No adverse reaction                                                bb  

04:38 Drug: Dilaudid (HYDROmorphone) 1 mg {Note: RASS 0.} Route: IVP; Site: right antecubital;bb  

05:03 Follow up: Response: No adverse reaction; Pain is decreased; RASS: Alert and Calm (0)   bb  

                                                                                                  

                                                                                                  

Disposition:                                                                                      

04:34 Co-signature as Attending Physician, Isael Roth MD I agree with the assessment and  keith 

      plan of care.                                                                               

                                                                                                  

Disposition Summary:                                                                              

22 02:41                                                                                    

Discharge Ordered                                                                                 

      Location: Home                                                                          jr8 

      Problem: new                                                                            jr8 

      Symptoms: have improved                                                                 jr8 

      Condition: Stable                                                                       jr8 

      Diagnosis                                                                                   

        - Migraine without aura, not intractable                                              jr8 

      Followup:                                                                               jr8 

        - With: Private Physician                                                                  

        - When: 2 - 3 days                                                                         

        - Reason: Recheck today's complaints, Continuance of care, Re-evaluation by your           

      physician                                                                                   

      Followup:                                                                               keith 

        - With: Masood Zimmer MD                                                               

        - When: 2 - 3 days                                                                         

        - Reason: Recheck today's complaints, Re-evaluation by your physician                      

      Discharge Instructions:                                                                     

        - Discharge Summary Sheet                                                             jr8 

        - Migraine Headache                                                                   jr8 

      Forms:                                                                                      

        - Medication Reconciliation Form                                                      jr8 

        - Thank You Letter                                                                    jr8 

        - Antibiotic Education                                                                jr8 

        - Prescription Opioid Use                                                             jr8 

      Prescriptions:                                                                              

        - Fioricet -40 mg Oral capsule                                                       

            - take 2 capsule by ORAL route every 4 hours As needed; 40 capsule; Refills: 0,   jr8 

      Product Selection Permitted                                                                 

        - Zofran 4 mg Oral Tablet                                                                  

            - take 1 tablet by ORAL route every 12 hours As needed; 20 tablet; Refills: 0,    [facility] 

      Product Selection Permitted                                                                 

Signatures:                                                                                       

Dispatcher [employer]                           EDIsael Rojas MD MD cha Ballard, Brenda, RN                     RN   Dylon Ng PA                        PA   jr8                                                  

Isadora Arellano RN                       RN   lg3                                                  

                                                                                                  

Corrections: (The following items were deleted from the chart)                                    

02:40 01:18 Constitutional: This is a well developed, well nourished patient who is awake,    jr8 

      alert, and in no acute distress. ENT: Nares patent. No nasal discharge, no septal           

      abnormalities noted. Tympanic membranes are normal and external auditory canals are         

      clear. Oropharynx with no redness, swelling, or masses, exudates, or evidence of            

      obstruction, uvula midline. Mucous membranes moist. Neck: Trachea midline, no               

      thyromegaly or masses palpated, and no cervical lymphadenopathy. Supple, full range of      

      motion without nuchal rigidity, or vertebral point tenderness. No Meningismus.              

      Cardiovascular: Regular rate and rhythm with a normal S1 and S2. No gallops, murmurs,       

      or rubs. Normal PMI, no JVD. No pulse deficits. Respiratory: Lungs have equal breath        

      sounds bilaterally, clear to auscultation and percussion. No rales, rhonchi or wheezes      

      noted. No increased work of breathing, no retractions or nasal flaring. Abdomen/GI:         

      Soft, non-tender, with normal bowel sounds. No distension or tympany. No guarding or        

      rebound. No evidence of tenderness throughout. Back: No spinal tenderness. No               

      costovertebral tenderness. Full range of motion. Skin: Warm, dry with normal turgor.        

      Normal color with no rashes, no lesions, and no evidence of cellulitis. MS/ Extremity:      

      Pulses equal, no cyanosis. Neurovascular intact. Full, normal range of motion. Neuro:       

      Awake and alert, GCS 15, oriented to person, place, time, and situation. Cranial nerves     

      II-XII grossly intact. Motor strength 5/5 in all extremities. Sensory grossly intact.       

      Cerebellar exam normal. Normal gait. jr8                                                    

                                                                                                  

**************************************************************************************************

## 2022-03-30 NOTE — ER
Nurse's Notes                                                                                     

 [facility]                                                                 

Name: Leanne Canseco                                                                               

Age: 56 yrs                                                                                       

Sex: Female                                                                                       

: 1965                                                                                   

MRN: [id number]                                                                                   

Arrival Date: 2022                                                                          

Time: 00:35                                                                                       

Account#: [id number]                                                                            

Bed 5                                                                                             

Private MD:                                                                                       

Diagnosis: Migraine without aura, not intractable                                                 

                                                                                                  

Presentation:                                                                                     

                                                                                             

00:43 Chief complaint: Patient states: headache in frontal forehead area radiating down to    lg3 

      left side of face and into left side of neck. has a bad tooth on left side. thinks          

      could be possible cause of constant pain. mouth pain getting worse. referred to             

      periodontist but cannot afford. states blood and purulent drainage is always coming         

      out. seen at a clinic today. was told to come to ER because of left leg swelling and HR     

      of 100. thinks she may have a DVT. Coronavirus screen: Client denies travel out of the      

      [place] in the last 14 days. At this time, the client does not indicate any symptoms           

      associated with coronavirus-19. Ebola Screen: No symptoms or risks identified at this       

      time. Initial Sepsis Screen: Does the patient meet any 2 criteria? HR > 90 bpm. No.         

      Patient's initial sepsis screen is negative. Does the patient have a suspected source       

      of infection? No. Patient's initial sepsis screen is negative. Risk Assessment: Do you      

      want to hurt yourself or someone else? Patient reports no desire to harm self or            

      others. Onset of symptoms is unknown.                                                       

00:43 Method Of Arrival: Ambulatory                                                           lg3 

00:43 Acuity: GIA 3                                                                           lg3 

                                                                                                  

Triage Assessment:                                                                                

00:51 Headache History: The patient has had previous headaches and this one is similar to     lg3 

      previous episodes. General: Appears in no apparent distress. uncomfortable, Behavior is     

      cooperative, anxious. Pain: Complains of pain in headache. mouth, neck Pain currently       

      is 10 out of 10 on a pain scale. Pain began a few weeks ago Also complains of no other      

      associated symptoms. EENT: No deficits noted. Oral mucosa is moist. Neuro: No deficits      

      noted. Level of Consciousness is awake, alert, obeys commands, Oriented to person,          

      place, time, situation. Cardiovascular: No deficits noted. Denies chest pain, shortness     

      of breath. Respiratory: No deficits noted. Airway is patent Trachea midline Respiratory     

      effort is even, unlabored, Respiratory pattern is regular, symmetrical. GI: No deficits     

      noted. No signs and/or symptoms were reported involving the gastrointestinal system.        

      : No deficits noted. No signs and/or symptoms were reported regarding the                 

      genitourinary system. Derm: No deficits noted. No signs and/or symptoms reported            

      regarding the dermatologic system. Skin is intact, is healthy with good turgor, Skin is     

      dry. Musculoskeletal: No deficits noted. No signs and/or symptoms reported regarding        

      the musculoskeletal system. Circulation, motion, and sensation intact. Capillary refill     

      < 3 seconds, Range of motion: intact in all extremities.                                    

                                                                                                  

OB/GYN:                                                                                           

00:51 LMP 3/23/2022                                                                           lg3 

                                                                                                  

Historical:                                                                                       

- Allergies:                                                                                      

00:51 PENICILLINS;                                                                            lg3 

00:51 Sulfa (Sulfonamide Antibiotics);                                                        lg3 

- Home Meds:                                                                                      

00:51 Buspirone Oral [Active]; gabapentin Oral [Active]; Seroquel Oral nightly [Active];      lg3 

      Lisinopril Oral [Active]; Propranolol Oral [Active]; Synthroid Oral [Active];               

      Dicyclomine Oral [Active]; Methocarbamol Oral [Active];                                     

- PMHx:                                                                                           

00:51 Anxiety; Hypertension; Hypothyroidism;                                                  lg3 

- PSHx:                                                                                           

00:51 None;                                                                                   lg3 

                                                                                                  

- Immunization history:: Adult Immunizations up to date, Client reports having NOT                

  received the Covid vaccine.                                                                     

- Social history:: Smoking status: Patient denies any tobacco usage or history of.                

  Patient uses alcohol, occasionally.                                                             

                                                                                                  

                                                                                                  

Screenin:55 Abuse screen: Denies threats or abuse. Denies injuries from another. Nutritional        lg3 

      screening: No deficits noted. Tuberculosis screening: No symptoms or risk factors           

      identified. Fall Risk None identified.                                                      

                                                                                                  

Assessment:                                                                                       

01:00 General: Appears in no apparent distress. uncomfortable, slender, Behavior is           bb  

      cooperative, anxious, Reports pain in face, neck and mouth. Pain: Complains of pain in      

      face, neck, mouth. Neuro: Level of Consciousness is awake, alert, obeys commands,           

      Oriented to person, place, time, situation. Cardiovascular: Capillary refill < 3            

      seconds Patient's skin is warm and dry. Respiratory: Respiratory effort is even,            

      unlabored, Respiratory pattern is regular. GI: No signs and/or symptoms were reported       

      involving the gastrointestinal system. Derm: Skin is pink, warm \T\ dry. Musculoskeletal:   

      Circulation, motion, and sensation intact.                                                  

02:00 Reassessment: pt sleeping, eyes closed, resp unlabored.                                 bb  

03:17 Reassessment: pt sleeping, eyes closed, resp unlabored, will wait for discharge at this bb  

      time.                                                                                       

04:33 Reassessment: Patient is alert, oriented x 3, equal unlabored respirations, skin        bb  

      warm/dry/pink. Pt is awake and complaining that her head still hurts Dr Roth at         

      bedside awaiting new orders.                                                                

04:56 Reassessment: Patient is alert, oriented x 3, equal unlabored respirations, skin        bb  

      warm/dry/pink. pt states the pain is starting to ease up and she would like to go home      

      now. Pt verbalized understanding of and agrees to plan of care discharge instructions       

      given pt ambulated with steady gait to exit accompanied by family.                          

                                                                                                  

Vital Signs:                                                                                      

00:43  / 93; Pulse 101; Resp 18 S; Temp 97.6(O); Pulse Ox 100% on R/A; Weight 61.23 kg  lg3 

      (R); Height 5 ft. 2 in. (157.48 cm) (R); Pain 10/10;                                        

04:34  / 99; Pulse 100; Resp 18 S; Pulse Ox 98% on R/A;                                 bb  

00:43 Body Mass Index 24.69 (61.23 kg, 157.48 cm)                                             lg3 

                                                                                                  

[place] Coma Score:                                                                               

04:35 Eye Response: spontaneous(4). Verbal Response: oriented(5). Motor Response: obeys       keith 

      commands(6). Total: 15.                                                                     

                                                                                                  

ED Course:                                                                                        

00:35 Patient arrived in ED.                                                                  kz  

00:51 Triage completed.                                                                       lg3 

00:51 Arm band placed on right wrist.                                                         lg3 

01:00 Dylon Bowman PA is PHCP.                                                               [id number] 

01:00 Isael Roth MD is Attending Physician.                                             [id number] 

01:00 Patient has correct armband on for positive identification. Bed in low position. Call   bb  

      light in reach. Adult w/ patient.                                                           

01:28 Sonia Sol, RN is Primary Nurse.                                                   bb  

01:32 CT Head C Spine In Process Unspecified.                                                 EDMS

02:02 Inserted saline lock: 20 gauge in right antecubital area, using aseptic technique.      ds4 

      Blood collected.                                                                            

04:36 Masood Zimmer MD is Referral Physician.                                             keith 

05:03 No provider procedures requiring assistance completed. IV discontinued, intact,         bb  

      bleeding controlled, No redness/swelling at site. Pressure dressing applied.                

                                                                                                  

Administered Medications:                                                                         

05:03 Discontinued: NS 0.9% 1000 ml IV at 1 bolus Per protocol; 1000 mL bolus                 bb  

02:16 Drug: Reglan (metoCLOPramide) 10 mg Route: IVP; Site: right antecubital;                bb  

03:18 Follow up: Response: Marked relief of symptoms                                          bb  

02:16 Drug: Benadryl (diphenhydrAMINE) 25 mg Route: IVP; Site: right antecubital;             bb  

03:18 Follow up: Response: Marked relief of symptoms                                          bb  

04:35 Drug: NS 0.9% 1000 ml Route: IV; Rate: 1 bolus; Site: right antecubital;                bb  

04:35 Drug: Zofran (Ondansetron) 4 mg Route: IVP; Site: right antecubital;                    bb  

05:03 Follow up: Response: No adverse reaction                                                bb  

04:37 Drug: Ketorolac 30 mg Route: IVP; Site: right antecubital;                              bb  

05:03 Follow up: Response: No adverse reaction                                                bb  

04:38 Drug: Dilaudid (HYDROmorphone) 1 mg {Note: RASS 0.} Route: IVP; Site: right antecubital;bb  

05:03 Follow up: Response: No adverse reaction; Pain is decreased; RASS: Alert and Calm (0)   bb  

                                                                                                  

                                                                                                  

Outcome:                                                                                          

02:41 Discharge ordered by MD. bhatti 

05:03 Discharged to home ambulatory, with family.                                             bb  

05:03 Condition: stable                                                                           

05:03 Discharge instructions given to patient, Instructed on discharge instructions, follow       

      up and referral plans. no driving heavy equipment, medication usage, Demonstrated           

      understanding of instructions, follow-up care, medications, Prescriptions given X 2.        

05:04 Patient left the ED.                                                                    bb  

                                                                                                  

Signatures:                                                                                       

Dispatcher [employer]                           EDIsael Rojas MD MD cha Ballard, Brenda RN                     RN   Dylon Ng PA PA jr8 Swanson, Donovan                             ds4                                                  

Isadora Arellano RN                       RN   lg3                                                  

Camila Stallworth                                                   

                                                                                                  

**************************************************************************************************

## 2022-03-30 NOTE — RAD REPORT
EXAM DESCRIPTION:  CT - Head C Spine Mpr Wo Con - 3/30/2022 6:43 am

 

CLINICAL HISTORY:  PAIN

 

COMPARISON:  None.

 

TECHNIQUE:  CT HEAD AND CERVICAL SPINE WITHOUT CONTRAST on 3/30/2022 1:13 AM CDT

This exam was performed according to our departmental dose-optimization program, which includes autom
ated exposure control, adjustment of the mA and/or kV according to patient size and/or use of iterati
ve reconstruction technique.

 

FINDINGS:  Brain: There is no acute hemorrhage, mass effect or midline shift. Gray-white differentiat
ion is preserved. There is no hydrocephalus. There is no significant volume loss for age.

The calvarium is intact. Orbits and globes are unremarkable. The paranasal sinuses are clear. Mastoid
 air cells are clear.

Cervical Spine: There is no acute fracture. There is grade 1 anterolisthesis of C4 on C5.

There is mild narrowing of the C6-7 disc. Vertebral body heights are preserved. Soft tissues are unre
markable.

 

IMPRESSION:  No acute postraumatic findings.

 

Electronically signed by:   Zaki Francis MD   3/30/2022 2:02 AM CDT Workstation: [id number]

 

 

 

Due to temporary technical issues with the PACS/Fluency reporting system, reports are being signed by
 the in house radiologists without review as a courtesy to insure prompt reporting. The interpreting 
radiologist is fully responsible for the content of the report.

## 2022-07-22 ENCOUNTER — HOSPITAL ENCOUNTER (EMERGENCY)
Dept: HOSPITAL 97 - ER | Age: 57
Discharge: HOME | End: 2022-07-22
Payer: SELF-PAY

## 2022-07-22 VITALS — SYSTOLIC BLOOD PRESSURE: 132 MMHG | OXYGEN SATURATION: 99 % | DIASTOLIC BLOOD PRESSURE: 88 MMHG

## 2022-07-22 VITALS — TEMPERATURE: 98.2 F

## 2022-07-22 DIAGNOSIS — R05.9: Primary | ICD-10-CM

## 2022-07-22 DIAGNOSIS — Z88.0: ICD-10-CM

## 2022-07-22 DIAGNOSIS — I10: ICD-10-CM

## 2022-07-22 DIAGNOSIS — Z88.2: ICD-10-CM

## 2022-07-22 DIAGNOSIS — H10.30: ICD-10-CM

## 2022-07-22 LAB
BUN BLD-MCNC: 15 MG/DL (ref 7–18)
GLUCOSE SERPLBLD-MCNC: 81 MG/DL (ref 74–106)
HCT VFR BLD CALC: 37.2 % (ref 36–45)
LYMPHOCYTES # SPEC AUTO: 2.5 K/UL (ref 0.7–4.9)
MCV RBC: 84.8 FL (ref 80–100)
PMV BLD: 6.9 FL (ref 7.6–11.3)
POTASSIUM SERPL-SCNC: 3.2 MMOL/L (ref 3.5–5.1)
RBC # BLD: 4.39 M/UL (ref 3.86–4.86)

## 2022-07-22 PROCEDURE — 96375 TX/PRO/DX INJ NEW DRUG ADDON: CPT

## 2022-07-22 PROCEDURE — 87490 CHLMYD TRACH DNA DIR PROBE: CPT

## 2022-07-22 PROCEDURE — 80048 BASIC METABOLIC PNL TOTAL CA: CPT

## 2022-07-22 PROCEDURE — 87804 INFLUENZA ASSAY W/OPTIC: CPT

## 2022-07-22 PROCEDURE — 87081 CULTURE SCREEN ONLY: CPT

## 2022-07-22 PROCEDURE — 85025 COMPLETE CBC W/AUTO DIFF WBC: CPT

## 2022-07-22 PROCEDURE — 99284 EMERGENCY DEPT VISIT MOD MDM: CPT

## 2022-07-22 PROCEDURE — 87590 N.GONORRHOEAE DNA DIR PROB: CPT

## 2022-07-22 PROCEDURE — 81003 URINALYSIS AUTO W/O SCOPE: CPT

## 2022-07-22 PROCEDURE — 87210 SMEAR WET MOUNT SALINE/INK: CPT

## 2022-07-22 PROCEDURE — 96361 HYDRATE IV INFUSION ADD-ON: CPT

## 2022-07-22 PROCEDURE — 36415 COLL VENOUS BLD VENIPUNCTURE: CPT

## 2022-07-22 PROCEDURE — 87811 SARS-COV-2 COVID19 W/OPTIC: CPT

## 2022-07-22 PROCEDURE — 96365 THER/PROPH/DIAG IV INF INIT: CPT

## 2022-07-22 PROCEDURE — 87070 CULTURE OTHR SPECIMN AEROBIC: CPT

## 2022-07-22 NOTE — EDPHYS
Physician Documentation                                                                           

 [facility]                                                                 

Name: Leanne Canseco                                                                               

Age: 57 yrs                                                                                       

Sex: Female                                                                                       

: 1965                                                                                   

MRN: [id number]                                                                                   

Arrival Date: 2022                                                                          

Time: 15:29                                                                                       

Account#: [id number]                                                                            

Bed 25                                                                                            

Private MD:                                                                                       

ED Physician Brian Blood                                                                         

HPI:                                                                                              

                                                                                             

15:49 This 57 yrs old Female presents to ER via Ambulatory with complaints of Covid+, Cough,  jmm 

      Congestion, Vaginal Discharge.                                                              

15:49 The patient or guardian reports cough. Onset: The symptoms/episode began/occurred       jmm 

      gradually, 3 day(s) ago. Modifying factors: The symptoms are alleviated by nothing. the     

      symptoms are aggravated by nothing. This is a 57 year old female with a history of htn,     

      hypothyroidism, anxiety that presents to the ED with complaints of cough, congestion,       

      body aches, diarrhea. Also states she has developed abnormal vaginal discharge. .           

                                                                                                  

OB/GYN:                                                                                           

15:37 LMP 2022                                                                           ld1 

                                                                                                  

Historical:                                                                                       

- Allergies:                                                                                      

15:37 PENICILLINS;                                                                            ld1 

15:37 Sulfa (Sulfonamide Antibiotics);                                                        ld1 

- PMHx:                                                                                           

15:37 Anxiety; Hypertension; Hypothyroidism;                                                  ld1 

- PSHx:                                                                                           

15:37 None;                                                                                   ld1 

                                                                                                  

- Immunization history:: Adult Immunizations up to date, Client reports having NOT                

  received the Covid vaccine.                                                                     

- Social history:: Smoking status: Patient denies any tobacco usage or history of.                

  Patient/guardian denies using alcohol.                                                          

                                                                                                  

                                                                                                  

ROS:                                                                                              

15:49 MS/Extremity: Negative for injury and deformity.                                        jmm 

15:49 Constitutional: Positive for body aches, chills.                                            

15:49 Respiratory: Positive for cough.                                                            

15:49 Abdomen/GI: Positive for nausea, diarrhea.                                                  

15:49 : Positive for vaginal discharge.                                                         

15:49 All other systems are negative.                                                             

                                                                                                  

Exam:                                                                                             

15:49 Constitutional:  This is a well developed, well nourished patient who is awake, alert,  jmm 

      and in no acute distress. Head/Face:  atraumatic. Eyes:  EOMI, no conjunctival erythema     

      appreciated ENT:  Moist Mucus Membranes Neck:  Trachea midline, Supple Chest/axilla:        

      Normal chest wall appearance and motion.   Cardiovascular:  Regular rate and rhythm.        

      No edema appreciated Respiratory:  Normal respirations, no respiratory distress             

      appreciated Abdomen/GI:  Non distended Back:  Normal ROM Skin:  General appearance          

      color normal MS/ Extremity:  Moves all extremities, no obvious deformities appreciated,     

      no edema noted to the lower extremities  Neuro:  Awake and alert Psych:  Behavior is        

      normal, Mood is normal, Patient is cooperative and pleasant                                 

15:49 : Pelvic Exam: Speculum exam: normal findings, a female chaperone was present for the     

      exam.                                                                                       

15:49 Musculoskeletal/extremity: ROM: intact in all extremities.                                  

15:49 Skin: Appearance: Color: normal in color.                                                   

15:49 Neuro: Orientation: is normal, Mentation: is normal, Memory: is normal.                     

15:49 Psych: Behavior/mood is pleasant, cooperative.                                              

                                                                                                  

Vital Signs:                                                                                      

15:35  / 108; Pulse 114; Resp 18; Pulse Ox 100% on R/A; Weight 54.43 kg; Height 5 ft. 0 ld1 

      in. (152.40 cm); Pain 0/10;                                                                 

16:32  / 89; Pulse 82; Resp 16; Pulse Ox 100% on R/A;                                   jb4 

18:08  / 91; Pulse 95; Resp 18; Temp 98.2(O); Pulse Ox 100% on R/A;                     jb4 

19:30  / 88; Pulse 67; Resp 16; Pulse Ox 99% on R/A;                                    jb4 

15:35 Body Mass Index 23.44 (54.43 kg, 152.40 cm)                                             ld1 

                                                                                                  

MDM:                                                                                              

15:49 Patient medically screened.                                                             [facility] 

20:02 Data reviewed: vital signs, nurses notes. Counseling: I had a detailed discussion with  jerzy 

      the patient and/or guardian regarding: the historical points, exam findings, and any        

      diagnostic results supporting the discharge/admit diagnosis, lab results, the need for      

      outpatient follow up, to return to the emergency department if symptoms worsen or           

      persist or if there are any questions or concerns that arise at home.                       

                                                                                                  

                                                                                             

15:50 Order name: SARS RAPID; Complete Time: 16:59                                            [facility] 

                                                                                             

15:50 Order name: CBC with Diff; Complete Time: 16:47                                         [facility] 

                                                                                             

15:50 Order name: BMP; Complete Time: 16:59                                                   [facility] 

                                                                                             

17:29 Order name: Urine Dipstick-Ancillary; Complete Time: 17:33                              [facility]

                                                                                             

17:36 Order name: Influenza Screen (a \T\ B); Complete Time: 19:26                              [facility]

                                                                                             

17:36 Order name: Strep; Complete Time: 18:20                                                 [facility] 

                                                                                             

18:15 Order name: Throat Culture                                                              [facility]

                                                                                             

18:37 Order name: GC (GONORR/CHLAMYDIA) Probe                                                 [facility] 

                                                                                             

18:37 Order name: Wet Prep; Complete Time: 20:02                                              [facility] 

                                                                                             

15:50 Order name: Saline Lock; Complete Time: 16:20                                           [facility] 

                                                                                             

16:21 Order name: Urine Dipstick-Ancillary (obtain specimen); Complete Time: 17:35            [facility] 

                                                                                                  

Administered Medications:                                                                         

16:31 Drug: Decadron - Dexamethasone 10 mg Route: IVP; Site: left antecubital;                jb4 

17:00 Follow up: Response: No adverse reaction                                                [facility] 

16:31 Drug: NS 0.9% 500 ml Route: IV; Rate: bolus; Site: left antecubital;                    jb4 

17:15 Follow up: Response: No adverse reaction; IV Status: Completed infusion; IV Intake:     jb4 

      500ml                                                                                       

17:54 Not Given (Other Intervention Used): Rocephin (cefTRIAXone) 1 grams IV at calculated    jb4 

      rate once; Given slow IV push per pharmacy instructions                                     

18:05 Drug: Tylenol 650 mg Route: PO;                                                         jb4 

19:00 Follow up: Response: No adverse reaction; Marked relief of symptoms                     jb4 

18:05 Drug: Rocephin - (cefTRIAXone) 1 grams Route: IVPB; Infused Over: 30 mins; Site: left   jb4 

      antecubital;                                                                                

18:35 Follow up: Response: No adverse reaction; IV Status: Completed infusion; IV Intake: 71oorx2 

                                                                                                  

                                                                                                  

Disposition Summary:                                                                              

22 20:03                                                                                    

Discharge Ordered                                                                                 

      Location: Home                                                                          [facility] 

      Condition: Stable                                                                       [facility] 

      Diagnosis                                                                                   

        - Cough                                                                               [facility] 

        - Other acute conjunctivitis                                                          [facility] 

      Followup:                                                                               [facility] 

        - With: Private Physician                                                                  

        - When: 2 - 3 days                                                                         

        - Reason: Recheck today's complaints, Continuance of care, Re-evaluation by your           

      physician                                                                                   

      Discharge Instructions:                                                                     

        - Discharge Summary Sheet                                                             [facility] 

        - Bacterial Conjunctivitis, Adult                                                     jm 

        - Cough, Adult                                                                        [facility] 

      Forms:                                                                                      

        - Medication Reconciliation Form                                                      [facility] 

        - Thank You Letter                                                                    [facility] 

        - Antibiotic Education                                                                [facility] 

        - Prescription Opioid Use                                                             [facility] 

      Prescriptions:                                                                              

        - Zithromax Z-Deep 250 mg Oral Tablet                                                       

            - take 1 tablet by ORAL route as directed for 5 days Day 1 - take two (2) tablets [facility] 

      one time.  Day 2, 3, 4 , 5 take one (1) tablet once daily.; 6 tablet; Refills: 0,           

      Product Selection Permitted                                                                 

        - Erythromycin 5 mg/gram (0.5 %) Ophthalmic Ointment                                       

            - apply 1 centimeter by OPHTHALMIC route 2-3 times daily for 7 days; 1 tube;      angelinam 

      Refills: 0, Product Selection Permitted                                                     

Signatures:                                                                                       

Dispatcher [employer]                           Brandon Rivera PA PA jmm Bryson, James, RN                       RN   jb4                                                  

Meredith Bravo RN                     RN   ld1                                                  

                                                                                                  

**************************************************************************************************

## 2022-07-22 NOTE — XMS REPORT
Continuity of Care Document

                            Created on:2022



Patient:NATHALIA REYNOSO

Sex:Female

:1965

External Reference #:[id number]





Demographics







                          Address                   [address] [place] [address]

 

                          Home Phone                [phone]

 

                          Email Address             [address]

 

                          Preferred Language        English

 

                          Marital Status            Unknown

 

                          Jainism Affiliation     Unknown

 

                          Race                      Unknown

 

                          Additional Race(s)        Unavailable

 

                          Ethnic Group              Unknown









Author







                          Organization              [facility]

t

 

                          Address                   [address]



                                                    [place] [address]

 

                          Phone                     [phone]









Support







                Name            Relationship    Address         Phone

 

                NONE            W               [address]     Unavailable



                                                [place] [address] 









Care Team Providers







                    Name                Role                Phone

 

                    [place] [place] [facility] [facility] Primary Care Physician Unavailable

 

                    MAL                 Attending Clinician Unavailable









Problems

This patient has no known problems.



Allergies, Adverse Reactions, Alerts







       Allergy Allergy Status Severity Reaction(s) Onset  Inactive Treating Comm

ents 

Source



       Name   Type                        Date   Date   Clinician        

 

       ERYTHROM DRUG   Active        Hives  2018                      [facility]



       YCIN                               2-10                        ity of



                                          00:00:                      [address]

 

       METRONID DRUG   Active        Hives  2018                      [facility]



       AZOLE  INGREDI                      2-10                        ity of



       HCL                                00:00:                      [address]

 

       PENICILL Drug   Active        Hives  2018                      [facility]



       INS    Class                       2-10                        ity of



                                          00:00:                      [address]

 

       SULFA  Drug   Active        Hives  2018                      [facility]



       (SULFONA Class                       2-10                        ity of



       MIDE                               00:00:                      [place]



       ANTIBIOT                             [address] [place] [address])                                                           Branch







Medications

This patient has no known medications.



Procedures

This patient has no known procedures.



Encounters







        Start   End     Encounter Admission Attending Care    Care    Encounter 

Source



        Date/Time Date/Time Type    Type    Clinicians Facility Department ID   

   

 

        2021         Emergency                 [facility]    [id number] 

[facility]



        10:22:12                                                         ity of



                                                                        [facility]

 

        2022 Outpatient R       MAL    [facility]    [id number]

[id number] [facility]



        13:20:00 13:20:00                 MARGARITA                         ity o

f



                                                                        [facility]







Results

This patient has no known results.

## 2022-07-22 NOTE — ER
Nurse's Notes                                                                                     

 [facility]                                                                 

Name: Leanne Canseco                                                                               

Age: 57 yrs                                                                                       

Sex: Female                                                                                       

: 1965                                                                                   

MRN: [id number]                                                                                   

Arrival Date: 2022                                                                          

Time: 15:29                                                                                       

Account#: [id number]                                                                            

Bed 25                                                                                            

Private MD:                                                                                       

Diagnosis: Cough;Other acute conjunctivitis                                                       

                                                                                                  

Presentation:                                                                                     

                                                                                             

15:35 Chief complaint: Patient states: Covid + 22 - c/o cough, congestion, headache,     ld1 

      diarrhea. Coronavirus screen: Client presents with at least one sign or symptom that        

      may indicate coronavirus-19. Standard/surgical mask placed on the client. Ebola Screen:     

      No symptoms or risks identified at this time. Initial Sepsis Screen: Does the patient       

      meet any 2 criteria? No. Patient's initial sepsis screen is negative. Does the patient      

      have a suspected source of infection? No. Patient's initial sepsis screen is negative.      

      Risk Assessment: Do you want to hurt yourself or someone else? Patient reports no           

      desire to harm self or others. Onset of symptoms was 2022.                         

15:35 Method Of Arrival: Ambulatory                                                           ld1 

15:35 Acuity: GIA 4                                                                           ld1 

                                                                                                  

Triage Assessment:                                                                                

15:37 General: Appears in no apparent distress. uncomfortable, Behavior is appropriate for    ld1 

      age, anxious, crying, fussy. Pain: Complains of pain in face Pain does not radiate.         

      Pain currently is 8 out of 10 on a pain scale. Quality of pain is described as              

      throbbing. EENT: No signs and/or symptoms were reported regarding the EENT system.          

      Neuro: Level of Consciousness is awake, alert, obeys commands, Oriented to person,          

      place, time, situation. Cardiovascular: Capillary refill < 3 seconds Patient's skin is      

      warm and dry. Respiratory: Airway is patent Respiratory effort is even, unlabored,          

      Breath sounds are clear bilaterally. GI: Abdomen is flat, non-distended. : No signs       

      and/or symptoms were reported regarding the genitourinary system.                           

                                                                                                  

OB/GYN:                                                                                           

15:37 LMP 2022                                                                           ld1 

                                                                                                  

Historical:                                                                                       

- Allergies:                                                                                      

15:37 PENICILLINS;                                                                            ld1 

15:37 Sulfa (Sulfonamide Antibiotics);                                                        ld1 

- PMHx:                                                                                           

15:37 Anxiety; Hypertension; Hypothyroidism;                                                  ld1 

- PSHx:                                                                                           

15:37 None;                                                                                   ld1 

                                                                                                  

- Immunization history:: Adult Immunizations up to date, Client reports having NOT                

  received the Covid vaccine.                                                                     

- Social history:: Smoking status: Patient denies any tobacco usage or history of.                

  Patient/guardian denies using alcohol.                                                          

                                                                                                  

                                                                                                  

Screenin:32 Abuse screen: Denies threats or abuse. Nutritional screening: No deficits noted.        jb4 

      Tuberculosis screening: No symptoms or risk factors identified. Fall Risk None              

      identified.                                                                                 

                                                                                                  

Assessment:                                                                                       

16:32 General: Appears in no apparent distress. uncomfortable, Behavior is calm, cooperative, jb4 

      appropriate for age. Pain: Complains of pain in face, right eye and left eye Pain does      

      not radiate. Pain currently is 6 out of 10 on a pain scale. Neuro: Level of                 

      Consciousness is awake, alert, obeys commands, Oriented to person, place, time,             

      situation. Cardiovascular: Patient's skin is warm and dry. Respiratory: Reports cough       

      that is non-productive, persistent Airway is patent Respiratory effort is even,             

      unlabored, Respiratory pattern is regular, symmetrical. : Reports discharge, from         

      vagina that is green. EENT: Eyes with exudate noted from right inner canthus and left       

      inner canthus. Derm: Skin is intact, Skin is pink, warm \T\ dry. Musculoskeletal:           

      Circulation, motion, and sensation intact. Range of motion: intact in all extremities.      

18:08 Reassessment: Patient appears in no apparent distress at this time. Patient and/or      jb4 

      family updated on plan of care and expected duration. Pain level reassessed. Patient is     

      alert, oriented x 3, equal unlabored respirations, skin warm/dry/pink.                      

19:34 Reassessment: Patient appears in no apparent distress at this time. Patient and/or      jb4 

      family updated on plan of care and expected duration. Pain level reassessed. Patient is     

      alert, oriented x 3, equal unlabored respirations, skin warm/dry/pink.                      

                                                                                                  

Vital Signs:                                                                                      

15:35  / 108; Pulse 114; Resp 18; Pulse Ox 100% on R/A; Weight 54.43 kg; Height 5 ft. 0 ld1 

      in. (152.40 cm); Pain 0/10;                                                                 

16:32  / 89; Pulse 82; Resp 16; Pulse Ox 100% on R/A;                                   jb4 

18:08  / 91; Pulse 95; Resp 18; Temp 98.2(O); Pulse Ox 100% on R/A;                     jb4 

19:30  / 88; Pulse 67; Resp 16; Pulse Ox 99% on R/A;                                    jb4 

15:35 Body Mass Index 23.44 (54.43 kg, 152.40 cm)                                             1 

                                                                                                  

ED Course:                                                                                        

15:29 Patient arrived in ED.                                                                  mr  

15:37 Triage completed.                                                                       ld1 

15:37 Arm band placed on right wrist. EKG completed in triage. Results shown to MD.           [facility] 

15:39 Brandon Butler PA is PHCP.                                                              [facility] 

15:40 Brian Blood MD is Attending Physician.                                                [facility] 

16:07 Jaime العراقي, RN is Primary Nurse.                                                     jb4 

16:16 Initial lab(s) drawn, by me, sent to lab. COVID swab sent to lab. Inserted saline lock: jb4 

      20 gauge in left antecubital area, using aseptic technique. Blood collected.                

16:32 Patient has correct armband on for positive identification. Bed in low position. Call   jb4 

      light in reach. Side rails up X 1. Client placed on continuous cardiac and pulse            

      oximetry monitoring. NIBP monitoring applied.                                               

17:30 Urine collected: clean catch specimen, cloudy.                                          tm3 

20:17 No provider procedures requiring assistance completed. IV discontinued, intact,         jb4 

      bleeding controlled, No redness/swelling at site. Pressure dressing applied.                

                                                                                                  

Administered Medications:                                                                         

16:31 Drug: Decadron - Dexamethasone 10 mg Route: IVP; Site: left antecubital;                jb4 

17:00 Follow up: Response: No adverse reaction                                                jb4 

16:31 Drug: NS 0.9% 500 ml Route: IV; Rate: bolus; Site: left antecubital;                    jb4 

17:15 Follow up: Response: No adverse reaction; IV Status: Completed infusion; IV Intake:     jb4 

      500ml                                                                                       

17:54 Not Given (Other Intervention Used): Rocephin (cefTRIAXone) 1 grams IV at calculated    jb4 

      rate once; Given slow IV push per pharmacy instructions                                     

18:05 Drug: Tylenol 650 mg Route: PO;                                                         jb4 

19:00 Follow up: Response: No adverse reaction; Marked relief of symptoms                     jb4 

18:05 Drug: Rocephin - (cefTRIAXone) 1 grams Route: IVPB; Infused Over: 30 mins; Site: left   jb4 

      antecubital;                                                                                

18:35 Follow up: Response: No adverse reaction; IV Status: Completed infusion; IV Intake: [id number] 

                                                                                                  

                                                                                                  

Medication:                                                                                       

16:32 VIS not applicable for this client.                                                     jb4 

                                                                                                  

Intake:                                                                                           

17:15 IV: 500ml; Total: 500ml.                                                                jb4 

18:35 IV: 50ml; Total: 550ml.                                                                 jb4 

                                                                                                  

Outcome:                                                                                          

20:03 Discharge ordered by MD.                                                                jerzy 

20:17 Discharged to home ambulatory.                                                          jb4 

20:17 Condition: stable                                                                           

20:17 Discharge instructions given to patient, Instructed on discharge instructions, follow       

      up and referral plans. medication usage, Demonstrated understanding of instructions,        

      follow-up care, medications, Prescriptions given X 2.                                       

20:20 Patient left the ED.                                                                    jb4 

                                                                                                  

Signatures:                                                                                       

Miguel Boo3                                                  

Brandon Butler PA PA jmm Rivera, Mary                                 mr                                                   

Jaime العراقي, RN                       RN   jb4                                                  

Meredith Bravo, HUMBERTO                     RN   ld1                                                  

                                                                                                  

**************************************************************************************************

## 2023-01-02 ENCOUNTER — HOSPITAL ENCOUNTER (EMERGENCY)
Dept: HOSPITAL 97 - ER | Age: 58
Discharge: LEFT BEFORE DISCHARGE | End: 2023-01-02
Payer: SELF-PAY

## 2023-01-02 VITALS — OXYGEN SATURATION: 99 % | SYSTOLIC BLOOD PRESSURE: 144 MMHG | TEMPERATURE: 98.6 F | DIASTOLIC BLOOD PRESSURE: 106 MMHG

## 2023-01-02 DIAGNOSIS — Z53.21: Primary | ICD-10-CM

## 2023-01-02 PROCEDURE — 99281 EMR DPT VST MAYX REQ PHY/QHP: CPT

## 2023-01-02 NOTE — ER
Nurse's Notes                                                                                     

 [facility]                                                                 

Name: Leanne Canseco                                                                               

Age: 57 yrs                                                                                       

Sex: Female                                                                                       

: 1965                                                                                   

MRN: [id number]                                                                                   

Arrival Date: 2023                                                                          

Time: 05:58                                                                                       

Account#: [id number]                                                                            

Bed IW1                                                                                           

Private MD:                                                                                       

Diagnosis:                                                                                        

                                                                                                  

Presentation:                                                                                     

                                                                                             

06:27 Chief complaint: Patient states: she has a jaw infections was told she had necrotizing  bb  

      jaw infection has pain to right buttock which radiates she is having a lot of pain in       

      her mouth with difficulty eating. Coronavirus screen: At this time, the client does not     

      indicate any symptoms associated with coronavirus-19. Ebola Screen: No symptoms or          

      risks identified at this time. Initial Sepsis Screen: Does the patient meet any 2           

      criteria? No. Patient's initial sepsis screen is negative. Does the patient have a          

      suspected source of infection? No. Patient's initial sepsis screen is negative. Risk        

      Assessment: Do you want to hurt yourself or someone else? Patient reports no desire to      

      harm self or others. Onset of symptoms was 2022.                                     

06:27 Method Of Arrival: Ambulatory                                                             

06:27 Acuity: GIA 3                                                                           bb  

                                                                                                  

OB/GYN:                                                                                           

06:33 LMP 2022                                                                             bb  

                                                                                                  

Historical:                                                                                       

- Allergies:                                                                                      

06:33 PENICILLINS;                                                                            bb  

06:33 Sulfa (Sulfonamide Antibiotics);                                                        bb  

- PMHx:                                                                                           

06:33 Anxiety; Hypertension; Hypothyroidism;                                                  bb  

                                                                                                  

- Immunization history:: Client reports having NOT received the Covid vaccine.                    

- Social history:: Smoking status: Patient denies any tobacco usage or history of.                

                                                                                                  

                                                                                                  

Assessment:                                                                                       

07:56 Reassessment: Called to triage to be assessed by Dr. Mora. No answer. Unable to       ss  

      locate patient in ER [facility]. ER registration staff states they saw patient walk outside      

      with steady gait, but do not know where she went.                                           

08:42 [facility]. Unable to locate patient.             ss  

                                                                                                  

Vital Signs:                                                                                      

06:27  / 106; Pulse 125; Resp 16 S; Temp 98.6(O); Pulse Ox 99% on R/A; Weight 54.43 kg  bb  

      (R); Height 5 ft. 2 in. (157.48 cm) (R); Pain 9/10;                                         

06:27 Body Mass Index 21.95 (54.43 kg, 157.48 cm)                                               

                                                                                                  

ED Course:                                                                                        

05:58 Patient arrived in ED.                                                                  ja2 

06:33 Triage completed.                                                                       bb  

06:33 Arm band placed on Patient placed in waiting room, Patient notified of wait time.       bb  

07:48 Michelle Mora MD is Attending Physician.                                           sd2 

08:42 No provider procedures requiring assistance completed. Patient did not have IV access   ss  

      during this emergency room visit.                                                           

                                                                                                  

Administered Medications:                                                                         

No medications were administered                                                                  

                                                                                                  

                                                                                                  

Outcome:                                                                                          

08:42 Eloped from waiting room.                                                               ss  

08:42 unknown                                                                                     

08:43 Patient left the ED.                                                                    ss  

                                                                                                  

Signatures:                                                                                       

Sonia Sol, RN                     RN   Fely Zabala, HUMBERTO                      RN   ss                                                   

Patience Linda2                                                  

Michelle Mora MD MD   sd2                                                  

                                                                                                  

**************************************************************************************************

## 2023-01-02 NOTE — XMS REPORT
Continuity of Care Document

                           Created on:2023



Patient:NATHALIA REYNOSO

Sex:Female

:1965

External Reference #:[id number]





Demographics







                          Address                   [address] [place] [address]

 

                          Home Phone                [phone]

 

                          Work Phone                [phone]

 

                          Email Address             [address]

 

                          Preferred Language        English

 

                          Marital Status            Unknown

 

                          Protestant Affiliation     Unknown

 

                          Race                      Unknown

 

                          Additional Race(s)        Unavailable

 

                          Ethnic Group              Unknown









Author







                          Organization              [facility]

t

 

                          Address                   [address]



                                                    [place] [address]

 

                          Phone                     [phone]









Support







                Name            Relationship    Address         Phone

 

                NONE            W               [address]     Unavailable



                                                [place] [address] 









Care Team Providers







                    Name                Role                Phone

 

                    [facility] [place] [facility] Primary Care Physician Unavailable

 

                    MARGARITA RESENDEZ       Attending Clinician Unavailable









Problems

This patient has no known problems.



Allergies, Adverse Reactions, Alerts







       Allergy Allergy Status Severity Reaction(s) Onset  Inactive Treating Comm

ents 

Source



       Name   Type                        Date   Date   Clinician        

 

       ERYTHROM DRUG   Active        Hives  2018                      [facility]



       YCIN                               2-10                        ity of



                                          00:00:                      [address]



                                                                      Branch

 

       METRONID DRUG   Active        Hives  2018                      [facility]



       AZOLE  INGREDI                      2-10                        ity of



       HCL                                00:00:                      [address]



                                                                      Branch

 

       PENICILL Drug   Active        Hives  2018                      [facility]



       INS    Class                       2-10                        ity of



                                          00:00:                      [address]



                                                                      Branch

 

       SULFA  Drug   Active        Hives  2018                      [facility]



       (SULFONA Class                       2-10                        ity of



       [facility]                               00:00:                      [place]



       [facility]                                                       [facility]



       [facility])                                                           Branch







Medications

This patient has no known medications.



Procedures

This patient has no known procedures.



Encounters







        Start   End     Encounter Admission Attending Care    Care    Encounter 

Source



        Date/Time Date/Time Type    Type    Clinicians Facility Department ID   

   

 

        2021         Emergency                 [facility]    [id number] 

[facility]



        10:22:12                                                         ity of



                                                                        [facility]

 

        2022 Outpatient R       [facility]AL    [facility]    [id number]

[id number] [facility]



        13:20:00 13:20:00                 MARGARITA smith o

f



                                                                        [facility]







Results

This patient has no known results.

## 2023-02-21 ENCOUNTER — HOSPITAL ENCOUNTER (INPATIENT)
Dept: HOSPITAL 97 - ER | Age: 58
LOS: 4 days | Discharge: HOME | DRG: 917 | End: 2023-02-25
Attending: HOSPITALIST | Admitting: HOSPITALIST
Payer: SELF-PAY

## 2023-02-21 VITALS — BODY MASS INDEX: 24.8 KG/M2

## 2023-02-21 DIAGNOSIS — E03.9: ICD-10-CM

## 2023-02-21 DIAGNOSIS — Z79.899: ICD-10-CM

## 2023-02-21 DIAGNOSIS — F15.10: ICD-10-CM

## 2023-02-21 DIAGNOSIS — Z20.822: ICD-10-CM

## 2023-02-21 DIAGNOSIS — F10.20: ICD-10-CM

## 2023-02-21 DIAGNOSIS — Z71.41: ICD-10-CM

## 2023-02-21 DIAGNOSIS — Z88.0: ICD-10-CM

## 2023-02-21 DIAGNOSIS — Z88.1: ICD-10-CM

## 2023-02-21 DIAGNOSIS — F31.60: ICD-10-CM

## 2023-02-21 DIAGNOSIS — R77.8: ICD-10-CM

## 2023-02-21 DIAGNOSIS — Z79.890: ICD-10-CM

## 2023-02-21 DIAGNOSIS — T51.91XA: Primary | ICD-10-CM

## 2023-02-21 DIAGNOSIS — Z90.49: ICD-10-CM

## 2023-02-21 DIAGNOSIS — K04.7: ICD-10-CM

## 2023-02-21 DIAGNOSIS — I42.6: ICD-10-CM

## 2023-02-21 DIAGNOSIS — Z71.51: ICD-10-CM

## 2023-02-21 DIAGNOSIS — Z79.82: ICD-10-CM

## 2023-02-21 DIAGNOSIS — I10: ICD-10-CM

## 2023-02-21 DIAGNOSIS — F17.200: ICD-10-CM

## 2023-02-21 DIAGNOSIS — G93.41: ICD-10-CM

## 2023-02-21 DIAGNOSIS — I50.21: ICD-10-CM

## 2023-02-21 LAB
ALBUMIN SERPL BCP-MCNC: 3.3 G/DL (ref 3.4–5)
ALP SERPL-CCNC: 75 U/L (ref 45–117)
ALT SERPL W P-5'-P-CCNC: 61 U/L (ref 13–56)
AST SERPL W P-5'-P-CCNC: 52 U/L (ref 15–37)
BUN BLD-MCNC: 18 MG/DL (ref 7–18)
GLUCOSE SERPLBLD-MCNC: 107 MG/DL (ref 74–106)
HCT VFR BLD CALC: 39.6 % (ref 36–45)
INR BLD: 1.12
LIPASE SERPL-CCNC: 90 U/L (ref 73–393)
LYMPHOCYTES # SPEC AUTO: 2 K/UL (ref 0.7–4.9)
MCV RBC: 85.2 FL (ref 80–100)
NT-PROBNP SERPL-MCNC: 5608 PG/ML (ref ?–125)
PMV BLD: 7.5 FL (ref 7.6–11.3)
POTASSIUM SERPL-SCNC: 3.1 MMOL/L (ref 3.5–5.1)
RBC # BLD: 4.65 M/UL (ref 3.86–4.86)
TROPONIN I SERPL HS-MCNC: 54.3 PG/ML (ref ?–58.9)

## 2023-02-21 PROCEDURE — 96374 THER/PROPH/DIAG INJ IV PUSH: CPT

## 2023-02-21 PROCEDURE — 93017 CV STRESS TEST TRACING ONLY: CPT

## 2023-02-21 PROCEDURE — 83690 ASSAY OF LIPASE: CPT

## 2023-02-21 PROCEDURE — 76377 3D RENDER W/INTRP POSTPROCES: CPT

## 2023-02-21 PROCEDURE — 51702 INSERT TEMP BLADDER CATH: CPT

## 2023-02-21 PROCEDURE — 71275 CT ANGIOGRAPHY CHEST: CPT

## 2023-02-21 PROCEDURE — 36415 COLL VENOUS BLD VENIPUNCTURE: CPT

## 2023-02-21 PROCEDURE — 80307 DRUG TEST PRSMV CHEM ANLYZR: CPT

## 2023-02-21 PROCEDURE — 87811 SARS-COV-2 COVID19 W/OPTIC: CPT

## 2023-02-21 PROCEDURE — 93005 ELECTROCARDIOGRAM TRACING: CPT

## 2023-02-21 PROCEDURE — 80048 BASIC METABOLIC PNL TOTAL CA: CPT

## 2023-02-21 PROCEDURE — 84100 ASSAY OF PHOSPHORUS: CPT

## 2023-02-21 PROCEDURE — 80076 HEPATIC FUNCTION PANEL: CPT

## 2023-02-21 PROCEDURE — 74175 CTA ABDOMEN W/CONTRAST: CPT

## 2023-02-21 PROCEDURE — 80061 LIPID PANEL: CPT

## 2023-02-21 PROCEDURE — 84443 ASSAY THYROID STIM HORMONE: CPT

## 2023-02-21 PROCEDURE — 85610 PROTHROMBIN TIME: CPT

## 2023-02-21 PROCEDURE — 83880 ASSAY OF NATRIURETIC PEPTIDE: CPT

## 2023-02-21 PROCEDURE — 96375 TX/PRO/DX INJ NEW DRUG ADDON: CPT

## 2023-02-21 PROCEDURE — 78452 HT MUSCLE IMAGE SPECT MULT: CPT

## 2023-02-21 PROCEDURE — 84439 ASSAY OF FREE THYROXINE: CPT

## 2023-02-21 PROCEDURE — 71045 X-RAY EXAM CHEST 1 VIEW: CPT

## 2023-02-21 PROCEDURE — 70450 CT HEAD/BRAIN W/O DYE: CPT

## 2023-02-21 PROCEDURE — 81015 MICROSCOPIC EXAM OF URINE: CPT

## 2023-02-21 PROCEDURE — 70486 CT MAXILLOFACIAL W/O DYE: CPT

## 2023-02-21 PROCEDURE — 99285 EMERGENCY DEPT VISIT HI MDM: CPT

## 2023-02-21 PROCEDURE — 85025 COMPLETE CBC W/AUTO DIFF WBC: CPT

## 2023-02-21 PROCEDURE — 93306 TTE W/DOPPLER COMPLETE: CPT

## 2023-02-21 PROCEDURE — 84484 ASSAY OF TROPONIN QUANT: CPT

## 2023-02-21 PROCEDURE — 83735 ASSAY OF MAGNESIUM: CPT

## 2023-02-21 PROCEDURE — 80053 COMPREHEN METABOLIC PANEL: CPT

## 2023-02-21 NOTE — XMS REPORT
Continuity of Care Document

                          Created on:2023



Patient:NATHALIA REYNOSO

Sex:Female

:1965

External Reference #:[id number]





Demographics







                          Address                   [address] [place] [address]

 

                          Home Phone                [phone]

 

                          Work Phone                [phone]

 

                          Mobile Phone              [phone] )

 

                          Email Address             [address]

 

                          Preferred Language        English

 

                          Marital Status            Unknown

 

                          Pentecostal Affiliation     Unknown

 

                          Race                      Unknown

 

                          Additional Race(s)        Unavailable



                                                    White

 

                          Ethnic Group              Unknown









Author







                          Organization              [facility]

t

 

                          Address                   [address]



                                                    [place] [address]

 

                          Phone                     [phone]









Support







                Name            Relationship    Address         Phone

 

                NONE            W               [address]     [phone]



                                                [place] [address] 

 

                BRII RONQUILLO               Unavailable     [phone]









Care Team Providers







                    Name                Role                Phone

 

                    PCP, PATIENT DOES NOT HAVE A Primary Care Physician UnavailSILVIA Ferro Attending Clinician Unavailable

 

                    KAITLYN SMITH  Attending Clinician Unavailable

 

                    Kaitlyn Smith DO Attending Clinician [phone]

 

                    Doctor Unassigned, No Name Attending Clinician Unavailable

 

                    MARGARITA RESENDEZ       Attending Clinician Unavailable

 

                    KAITLYN SMITH  Admitting Clinician Unavailable









Payers







           Payer Name Policy Type Policy Number Effective Date Expiration Date S

ource







Problems

This patient has no known problems.



Allergies, Adverse Reactions, Alerts







       Allergy Allergy Status Severity Reaction(s) Onset  Inactive Treating Comm

ents 

Source



       Name   Type                        Date   Date   Clinician        

 

       ERYTHROM DRUG   Active        Hives  2018                      [facility]



       YCIN                               2-10                        ity of



                                          00:00:                      [address]



                                                                      Branch

 

       METRONID DRUG   Active        Hives  2018                      [facility]



       AZOLE  INGREDI                      2-10                        ity of



       HCL                                00:00:                      [place]



                                          00                          Medical



                                                                      Branch

 

       PENICILL Drug   Active        Hives  2018                      [facility]



       INS    Class                       2-10                        ity of



                                          00:00:                      [address]



                                                                      Branch

 

       SULFA  Drug   Active        Hives  2018                      [facility]



       (SULFONA Class                       2-10                        ity of



       MIDE                               00:00:                      [place]



       ANTIBIOT                                                       Medical



       ICS)                                                           Branch

 

       Erythrom Propensi Active        Hives  2018                      [facility]



       ycin   ty to                       2-10                        ity of



              adverse                      00:00:                      [place]



              reaction                      00                          Medical



              s                                                       Branch

 

       Metronid Propensi Active        Hives  2018                      [facility]



       azole  ty to                       2-10                        ity of



       Hcl    adverse                      00:00:                      [place]



              reaction                      00                          Medical



              s                                                       Branch

 

       Penicill Propensi Active        Hives  2018                      Univer

s



       ins    ty to                       2-10                        ity of



              adverse                      00:00:                      [place]



              reaction                      00                          Medical



              s                                                       Branch

 

       Sulfa  Propensi Active        Hives  2018                      [facility]



       (Sulfona ty to                       2-10                        ity of



       mide   adverse                      00:00:                      [place]



       Antibiot reaction                      00                          Medica

l



       ics)   s                                                       Branch







Social History







           Social Habit Start Date Stop Date  Quantity   Comments   Source

 

           Exposure to 2023 Not sure              [facility]



           SARS-CoV-2 00:00:00   18:35:00                         [facility]



           (event)                                                [place]

 

           Tobacco use and 2018-12-10 2018-12-10 Smokeless tobacco            Un

iversity of



           exposure   00:00:00   00:00:00   non-user              [facility]

 

           Sex Assigned At 1965                       [facility]

y of



           Birth      00:00:00   00:00:00                         [facility]









                Smoking Status  Start Date      Stop Date       Source

 

                Never smoked tobacco                                 [facility]







Medications







       Ordered Filled Start  Stop   Current Ordering Indication Dosage Frequency

 Signature

                    Comments            Components          Source



     Medication Medication Date Date Medication? Clinician                (SIG) 

          



     Name Name                                                   

 

     furosemide       No             40mg      40 mg, IV           U

nivers



     (LASIX)                                Push,           ity of



     injection      01:45: 01:47                          ONCE, 1           [place]

s



     40 mg      00   :00                           dose, On           Medical



                                                  [facility]



                                                  23 at           



                                                  1945, ASAP           

 

     NaCl 0.9%      2023- No             1000mL      at 999           Uni

vers



     (NS) bolus                                mL/hr,           ity of



     infusion      00:45: 01:10                          1,000 mL,           Alex[facility]ru

as



     1,000 mL      00   :00                           IV             Medical



                                                  Infusion,           Branch



                                                  ONCE, 1           



                                                  dose, On           



                                                  Sun            



                                                  23 at           



                                                  1845, ASAP           

 

     ondansetron      2023- No             4mg       4 mg, Slow          

 [facility]



     (ZOFRAN                                IV Push,           ity of



     (PF))      00:00: 00:09                          ONCE, 1           [place]



     injection 4      00   :00                           dose, On           Medi

lizzie



     mg                                           Sun            Branch



                                                  23 at           



                                                  1800, ASAP           

 

     furosemide            Yes       [id number] 20mg      Take 1           

[facility]



     20 mg      2-12                               tablet by           ity of



     tablet      00:00:                               mouth           [place]



               00                                 every           Medical



                                                  morning.           Branch

 

     acetaminoph            Yes       2745 1{tbl}      Take 1           Un

iona



     en-codeine      2-12                               tablet by           ity 

of



     (TYLENOL-CO      00:00:                               mouth           [place]



     DEINE #3)      00                                 every 4           Medical



     300-30 mg                                         (four)           Branch



     tablet                                         hours as           



                                                  needed for           



                                                  Pain           



                                                  (scale           



                                                  1-3).           



                                                  Indication           



                                                  s: chronic           



                                                  pain           

 

     acetaminoph      2018      Yes            1{tbl}      Take 1           Un

iona



     en-codeine      2-10                               tablet by           ity 

of



     300-30 mg      00:00:                               mouth 2           [place]



     tablet      00                                 (two)           Medical



                                                  times           Branch



                                                  daily as           



                                                  needed for           



                                                  Pain           



                                                  (scale           



                                                  4-6) or           



                                                  Pain           



                                                  (scale           



                                                  7-10).           

 

     chlorhexidi      2018      Yes            15mL      Swish and           U

nivers



     ne 0.12 %      2-10                               spit out           ity of



     mouthwash      00:00:                               15 mL 2           [place]



               00                                 (two)           Medical



                                                  times           Branch



                                                  daily.           

 

     ibuprofen      2018      Yes            600mg      Take 1           Unive

rs



     600 mg      2-10                               tablet by           ity of



     tablet      00:00:                               mouth           [place]



               00                                 every 6           Medical



                                                  (six)           Branch



                                                  hours as           



                                                  needed for           



                                                  Pain           



                                                  (scale           



                                                  1-3) or           



                                                  Pain           



                                                  (scale           



                                                  4-6).           

 

     chlorhexidi      2018      Yes            15mL      Swish and           U

nivers



     ne 0.12 %      2-10                               spit out           ity of



     mouthwash      00:00:                               15 mL 2           [place]



               00                                 (two)           Medical



                                                  times           Branch



                                                  daily.           

 

     ibuprofen      2018      Yes            600mg      Take 1           Unive

rs



     600 mg      2-10                               tablet by           ity of



     tablet      00:00:                               mouth           [place]



               00                                 every 6           Medical



                                                  (six)           Branch



                                                  hours as           



                                                  needed for           



                                                  Pain           



                                                  (scale           



                                                  1-3) or           



                                                  Pain           



                                                  (scale           



                                                  4-6).           

 

     acetaminoph      2018- No             1{tbl}      Take 1           U

nivers



     en-codeine      2-10 02-12                          tablet by           ity

 of



     300-30 mg      00:00: 00:00                          mouth 2           [place]

s



     tablet      00   :00                           (two)           Medical



                                                  times           Branch



                                                  daily as           



                                                  needed for           



                                                  Pain           



                                                  (scale           



                                                  4-6) or           



                                                  Pain           



                                                  (scale           



                                                  7-10).           







Vital Signs







             Vital Name   Observation Time Observation Value Comments     Source

 

             Systolic blood 2023 01:56:00 121 mm[Hg]                [facility]

sity of



             pressure                                            [place] [facility]



                                                                 Branch

 

             Diastolic blood 2023 01:56:00 90 mm[Hg]                 Unive

rsity of



             pressure                                            [facility]

 

             Heart rate   2023 01:56:00 108 /min                  [facility]

 

             Respiratory rate 2023 01:56:00 15 /min                   [facility]

 

             Oxygen saturation in 2023 01:56:00 96 /min                   

[facility]



             Arterial blood by                                        [place] [facility]

lizzie



             Pulse oximetry                                        [facility]

 

             Body temperature 2023 00:45:26 36.5 Roma                  [facility]

 

             Body height  2023 23:43:00 157.5 cm                  [facility]

 

             Body weight  2023 23:43:00 61.689 kg                 [facility]

 

             BMI          2023 23:43:00 24.87 kg/m2               [facility]







Procedures







                Procedure       Date / Time Performed Performing Clinician Sourc

e

 

                XR CHEST 1 VW   2023 00:34:22 Kaitlyn Smith [facility]

 

                COMP. METABOLIC PANEL 2023 00:07:00 Kaitlyn Smith [facility]



                ([id number])                                         [facility]

 

                CBC WITH DIFF   2023 00:07:00 Kaitlyn Smith [facility]

 

                RAPID INFLUENZA A/B 2023 00:07:00 Kaitlyn Smith [facility]

 

                N-TERMINAL PRO-BNP 2023 00:07:00 Kaitlyn Smith [facility]

 

                COVID-19 (ID NOW 2023 00:07:00 Kaitlyn Smith [facility]



                RAPID TESTING)                                  [facility]

 

                CONSENT/REFUSAL FOR 2023 23:41:00 [facility]



                DIAGNOSIS AND                   Name            [facility]



                TREATMENT                                       

 

                REFERRAL-       2023 06:01:00 Doctor Unassigned, No [facility]



                REQUEST/RESPONSE                 Name            [facility]







Encounters







        Start   End     Encounter Admission Attending Care    Care    Encounter 

Source



        Date/Time Date/Time Type    Type    Clinicians Facility Department ID   

   

 

        2021         Emergency                 [facility]    [id number] 

[facility]



        10:22:12                                                         ity [facility]

 

        2023 Emergency X       LUIS [facility]    ERT     [id number]

[id number] [facility]



        17:48:00 20:12:00                 KAITLYN                          ity of



                                                                        [facility]

 

        2023 [facility]         [facility]    [id number]

[id number] [facility]



        17:48:00 20:12:00                 Kaitlyn DESHPANDE [id number]         

ity [facility] [id number]         [place]

[facility]  [phone]         Medi

lizzie



                                                        [address]             Branch

 

        2023 Outpatient                 [facility]     [id number]

[id number]



        10:39:43 10:39:43                                         [id number]    F



                                                                        [place]

 

        2023 Orders          Doctor  GÓMEZ    [id number]

[id number] [facility]



        00:00:00 00:00:00 Only            Unassigned, ANANT   [id number]       

  ity of



                                        [facility] [id number]         [place]ru

as



                                                        [phone]         Medi

lizzie



                                                        [address]             Branch

 

        2022 Outpatient STEF RESENDEZ    [facility]    [id number]

[address] [facility]



        13:20:00 13:20:00                 MARGARITA smith o

f



                                                                        [facility]







Results

This patient has no known results.

## 2023-02-22 LAB
HDLC SERPL-MCNC: 50 MG/DL (ref 40–60)
LDLC SERPL CALC-MCNC: 28 MG/DL (ref ?–130)
METHAMPHET UR QL SCN: POSITIVE
THC SERPL-MCNC: NEGATIVE NG/ML
TROPONIN I SERPL HS-MCNC: 47.4 PG/ML (ref ?–58.9)
TSH SERPL DL<=0.05 MIU/L-ACNC: 7.56 UIU/ML (ref 0.36–3.74)

## 2023-02-22 RX ADMIN — LORAZEPAM SCH: 1 TABLET ORAL at 12:38

## 2023-02-22 RX ADMIN — ASPIRIN SCH: 81 TABLET, COATED ORAL at 09:00

## 2023-02-22 RX ADMIN — LORAZEPAM SCH: 1 TABLET ORAL at 20:38

## 2023-02-22 RX ADMIN — LORAZEPAM SCH: 1 TABLET ORAL at 04:38

## 2023-02-22 RX ADMIN — Medication SCH ML: at 20:42

## 2023-02-22 RX ADMIN — ATORVASTATIN CALCIUM SCH MG: 40 TABLET, FILM COATED ORAL at 20:41

## 2023-02-22 RX ADMIN — Medication SCH ML: at 08:38

## 2023-02-22 RX ADMIN — LORAZEPAM SCH: 1 TABLET ORAL at 16:38

## 2023-02-22 RX ADMIN — ENOXAPARIN SODIUM SCH MG: 40 INJECTION SUBCUTANEOUS at 08:38

## 2023-02-22 RX ADMIN — LORAZEPAM SCH: 1 TABLET ORAL at 08:38

## 2023-02-22 NOTE — P.HP
Certification for Inpatient


Patient admitted to: Observation


With expected LOS: <2 Midnights


Patient will require the following post-hospital care: None


Practitioner: I am a practitioner with admitting privileges, knowledge of 

patient current condition, hospital course, and medical plan of care.


Services: Services provided to patient in accordance with Admission requirements

found in Title 42 Section 412.3 of the Code of Federal Regulations





Patient History


Date of Service: 02/22/23


Primary Care Provider: Caitlyn Dueñas


Reason for admission: Chest Pain


History of Present Illness: 


Patient is a 57 year old female with past medical history of bipolar disorder, 

hypertension, hypothyroidism, and alcohol abuse who presented to the emergency 

department via EMS with complaints of chest pain. Patient reports that she had 

been cleaning a house and was walking up some stairs and started to feel a 

"crushing" chest pain. She states that she was recently diagnosed with 

congestive heart failure. She was noted to be tachycardic upon arrival to ED and

EKG showed an accelerated junctional rhythm. Her labs are significant for potass

ium 3.1, BNP 5600, AST 52, ALT 61, troponin 54.3, UDS positive for opiates and 

amphetamines. Chest xray and CTA chest negative. Later on, patient became more 

confused/agitated and only oriented x 1 (oriented x 4 upon arrival). Daughter 

states that she often experiences episodes like this. She also states that 

patient is a heavy alcohol drinker, occasional THC user, and has not been on any

of her psych meds for a few months now. Additionally, she has been seeing an 

oral surgeon for an abscess/infection in her gums/mandible. Additional imaging 

was obtained- Head CT negative. CT facial bones showed "Findings suggest either 

a large periapical abscess involving a left molar mandibular tooth versus a 

large dentigerous cyst which may be infected." Patient is hospitalized for 

further management. 





Allergies





Penicillins Allergy (Verified 02/22/23 04:38)


   Hives/Rash


Sulfa (Sulfonamide Antibiotics) Allergy (Verified 02/22/23 04:38)


   Hives/Rash





Home medications list reviewed: Yes





- Past Medical/Surgical History


Diabetic: No


-: Bipolar


-: Hypertension


-: Hypothyroidism


-: Appendectomy


Psychosocial/ Personal History: Patient works as a .





- Family History


Family History: Reviewed- Non-Contributory





- Social History


Smoking Status: Current some day smoker


Alcohol use: Yes


CD- Drugs: Yes


Caffeine use: Yes


Place of Residence: Home





Review of Systems


Cardiovascular: Chest Pain


Musculoskeletal: Other (Hip Pain)





Physical Examination





- Vital Signs


Temperature: 97.8 F


Blood Pressure: 98/78


Pulse: 109


Respirations: 19


Pulse Ox (%): 98





- Physical Exam


General: Alert, Disheveled, Confused


HEENT: Atraumatic, EOMI, Sclerae nonicteric


Neck: Supple, 2+ carotid pulse no bruit


Respiratory: Clear to auscultation bilaterally, Normal air movement


Cardiovascular: Regular rate/rhythm, Normal S1 S2


Gastrointestinal: Normal bowel sounds, No tenderness


Musculoskeletal: No tenderness


Integumentary: No rashes


Neurological: Normal speech, Abnormal affect





- Studies


Laboratory Data (last 24 hrs)





02/21/23 23:01: PT 12.3, INR 1.12


02/21/23 22:52: Total Bilirubin Cancelled, AST Cancelled, ALT Cancelled, 

Alkaline Phosphatase Cancelled, Lipase Cancelled


02/21/23 22:17: WBC 5.70, Hgb 12.7, Hct 39.6, Plt Count 330


02/21/23 22:17: Sodium 139, Potassium 3.1 L, BUN 18, Creatinine 1.02, Glucose 

107 H, Total Bilirubin 0.6, AST 52 H, ALT 61 H, Alkaline Phosphatase 75, Lipase 

90








Assessment and Plan





- Problems (Diagnosis)


(1) Chest pain


Current Visit: Yes   Status: Acute   


Qualifiers: 


   Chest pain type: unspecified   Qualified Code(s): R07.9 - Chest pain, 

unspecified   





(2) Bipolar disorder (manic depression)


Current Visit: Yes   Status: Chronic   


Qualifiers: 


   Active/Remission status: currently active   Current bipolar episode type: 

mixed   Current episode severity: unspecified   Qualified Code(s): F31.60 - 

Bipolar disorder, current episode mixed, unspecified   





(3) Hypertension


Current Visit: Yes   Status: Chronic   


Qualifiers: 


   Hypertension type: primary hypertension   Qualified Code(s): I10 - Essential 

(primary) hypertension   





(4) Hypothyroidism


Current Visit: Yes   Status: Chronic   


Qualifiers: 


   Hypothyroidism type: unspecified   Qualified Code(s): E03.9 - Hypothyroidism,

unspecified   





(5) Alcohol abuse


Current Visit: Yes   Status: Chronic   





(6) Periapical abscess


Current Visit: Yes   Status: Acute   





(7) Amphetamine abuse


Current Visit: Yes   Status: Acute   





- Plan


Chest Pain


She states the chest pain has resolved after morphine.


Initial troponin borderline at 55. Continue to trend. 


Monitor on telemetry. Cardiology consult.


Echo, TSH, and lipid panel ordered.


Aspirin and atorvastatin daily.


Patient reports new diagnosis of CHF per [facility] but has not had any 

cardiology follow up.





Bipolar Disorder


Patient has not been on her psych medications for a few months, per daughter.


Daughter states she has somewhat frequent episodes of AMS/confusion. 





Alcohol Abuse


Patient reports drinking 750 mL vodka every 2 days.


She does not know when her last drink was. Etoh negative today. 


[facility] protocol in place.





Amphetamine Abuse


UDS positive for amphetamines. Patient denies drug use. Daughter unaware of any 

amphetamine abuse.


Likely contributing to patient's tachycardia and altered mental status. 





Periapical abscess involving a left molar mandibular tooth


Has been seen by an oral surgeon who will not operate until patient has cardiac 

clearance.


Patient has been taking clindamycin and amoxicillin.





Hypertension & Hypothyroidism


Reconcile and continue home medications.





Patient's daughter, Sheila, can be reached at [phone].





Discharge Plan: Home


Plan to discharge in: 24 Hours





- Advance Directives


Does patient have a Living Will: No


Does patient have a Durable POA for Healthcare: No





- Code Status/Comfort Care


Code Status Assessed: Yes


Code Status: Full Code


Physician Review: Patient Assessed, Agree with Above Assessment and Plan


Critical Care: No


Time Spent Managing Pts Care (In Minutes): 50

## 2023-02-22 NOTE — ER
Nurse's Notes                                                                                     

 [facility]                                                                 

Name: Leanne Canseco                                                                               

Age: 57 yrs                                                                                       

Sex: Female                                                                                       

: 1965                                                                                   

MRN: [id number]                                                                                   

Arrival Date: 2023                                                                          

Time: 22:06                                                                                       

Account#: [id number]                                                                            

Bed 3                                                                                             

Private MD:                                                                                       

Diagnosis: Chest pain, unspecified                                                                

                                                                                                  

Presentation:                                                                                     

                                                                                             

22:06 Chief complaint: EMS states: "57 female sudden onset chest pain. sudden crushing pain.  tw5 

      Does have a history of heart failure. Pain does get worse with deep breaths or              

      pressure. we have her 325 ASA and 25 mg of promethazine by IV for the nausea.".             

      Coronavirus screen: Vaccine status: Patient reports being unvaccinated. Ebola Screen:       

      Patient negative for fever greater than or equal to 101.5 degrees Fahrenheit, and           

      additional compatible Ebola Virus Disease symptoms Patient denies exposure to               

      infectious person. Patient denies travel to an Ebola-affected area in the 21 days           

      before illness onset. Initial Sepsis Screen: Does the patient meet any 2 criteria? HR >     

      90 bpm. Does the patient have a suspected source of infection? No. Patient's initial        

      sepsis screen is negative. Risk Assessment: Do you want to hurt yourself or someone         

      else? Patient reports no desire to harm self or others. Onset of symptoms was 2023 at 20:30.                                                                          

22:06 Method Of Arrival: EMS: [facility]                                                    tw5 

22:06 Acuity: GIA 2                                                                           tw5 

                                                                                                  

Triage Assessment:                                                                                

22:16 General: Appears uncomfortable, Behavior is anxious, restless. Pain: Complains of pain  tw5 

      in chest Pain currently is 7 out of 10 on a pain scale. Cardiovascular: Rhythm is sinus     

      tachycardia.                                                                                

22:16 General: Reports "I have prolonged QT syndrome.".                                       tw5 

                                                                                                  

OB/GYN:                                                                                           

22:16 LMP N/A - Post-menopause                                                                tw5 

                                                                                                  

Historical:                                                                                       

- Allergies:                                                                                      

22:16 PENICILLINS;                                                                            tw5 

22:16 Sulfa (Sulfonamide Antibiotics);                                                        tw5 

- PMHx:                                                                                           

22:16 Anxiety; Hypertension; Hypothyroidism;                                                  tw5 

                                                                                                  

- Immunization history:: Flu vaccine is not up to date.                                           

- Social history:: Smoking status: Patient denies any tobacco usage or history of.                

                                                                                                  

                                                                                                  

Screenin:28 [facility] ED Fall Risk Assessment (Adult) History of falling in the last 3 months,       tw5 

      including since admission No falls in past 3 months (0 pts). Abuse screen: Denies           

      threats or abuse. Denies injuries from another. Nutritional screening: No deficits          

      noted. Tuberculosis screening: No symptoms or risk factors identified.                      

                                                                                             

03:18 Clinical [place] Withdrawal Assessment for Alcohol, revised (CIWA-Ar): Anxiety: 4 -   jb4 

      Moderately anxious, guarded Agitation: 4 - Moderately fidgety and restless Auditory         

      Disturbances: 6 - Extremely severe hallucinations Visual Disturbances: 6 - Extremely        

      severe visual hallucinations Orientation and Clouding of Sensorium: 1 - Oriented but        

      cannot do serial additions Total Score: > 20: Severe Withdrawal.                            

                                                                                                  

Assessment:                                                                                       

                                                                                             

22:28 General: Appears uncomfortable, Behavior is anxious, restless. Pain: Complains of pain  tw5 

      in chest Pain does not radiate. Pain began 2 hours ago. Neuro: Level of Consciousness       

      is awake, alert, obeys commands, Oriented to person, place, time, situation.                

      Cardiovascular: Rhythm is sinus tachycardia. Respiratory: Airway is patent Trachea          

      midline Respiratory pattern is regular, "It feels better when I hold my breath.".           

23:13 General: Reports "I feel like I also have an infection in my bone, and I am scared it   tw5 

      has traveled to my hip. I feel like there is a mass in my hip.". Neuro: Speech is           

      slurred.                                                                                    

                                                                                             

01:41 Reassessment: Patient appears in no apparent distress at this time. Patient and/or      jb4 

      family updated on plan of care and expected duration. Pain level reassessed. Patient is     

      alert, oriented x 3, equal unlabored respirations, skin warm/dry/pink.                      

02:15 General: Daughter at the bedside states " She has bipolar disorder, and sometimes when  tw5 

      she is in severe pain she just kind of checks out mentally.". Neuro:                        

02:25 Reassessment: Bri [phone] daughter.                                             tw5 

02:45 General: Reports With Daughter and provider present patient states that she can drink   tw5 

      up to a liter of vodka over 2-3 days. She also quickly mentioned that she had recently      

      stopped drinking after being seen at [place] and needing to see the cardiologist.          

      Neuro: Level of Consciousness is confused.                                                  

03:17 Reassessment: Pt is A\T\Ox1. Is seeing things and speaking with people who are not there. jb4

      given 1mg of ativan IVP.                                                                    

03:24 Neuro: Level of Consciousness is obtunded.                                              tw 

03:25 Reassessment: Patient placed in room in line of sight of nurses station..                

                                                                                                  

Vital Signs:                                                                                      

                                                                                             

22:06  / 99; Pulse 130; Resp 24; Temp 97.8; Pulse Ox 97% on R/A; Weight 61.69 kg;       tw5 

      Height 5 ft. 2 in. (157.48 cm); Pain 7/10;                                                  

23:13  / 98; Pulse 126; Resp 18; Pulse Ox 100% on R/A;                                  tw5 

                                                                                             

01:00  / 95; Pulse 119; Resp 16; Pulse Ox 100% on R/A;                                  jb4 

02:15  / 86; Pulse 114; Resp 22; Pulse Ox 100% on R/A;                                  tw5 

03:18 BP 98 / 78; Pulse 109; Resp 19; Pulse Ox 98% on R/A;                                    jb4 

03:38 BP 97 / 76; Pulse 100; Resp 14; Temp 99.7(R); Pulse Ox 100% ;                           tw5 

                                                                                             

22:06 Body Mass Index 24.87 (61.69 kg, 157.48 cm)                                              

                                                                                                  

ED Course:                                                                                        

                                                                                             

22:06 Patient arrived in ED.                                                                   

22:15 Triage completed.                                                                        

22:16 Arm band placed on left wrist.                                                           

22:18 Ade Amaya is Primary Nurse.                                                          

22:23 Basic Metabolic Panel Sent.                                                              

22:23 CBC with Diff Sent.                                                                      

22:23 NT PRO-BNP Sent.                                                                         

22:23 Troponin HS Sent.                                                                        

22:23 Initial lab(s) drawn, by me, sent to lab. EKG done, by EKG tech. reviewed by Jordin      twMayelin Rodriguez MD. Maintain EMS IV. Dressing intact. Good blood return noted. Site clean \T\       

      dry. Gauge \T\ site: 20 LAC.                                                                

22:28 Patient has correct armband on for positive identification. Placed in gown. Bed in low  tw5 

      position. Call light in reach. Side rails up X2. Adult w/ patient. Client placed on         

      continuous cardiac and pulse oximetry monitoring. NIBP monitoring applied. Door closed.     

      Noise minimized. Moved to private room. Warm blanket given. Verbal reassurance given.       

22:28 No provider procedures requiring assistance completed. Patient maintains SpO2           tw5 

      saturation greater than 95% on room air.                                                    

22:29 Isael Cano PA is PHCP.                                                                cp  

22:29 Jordin Rodriguez MD is Attending Physician.                                              cp  

22:57 PT-INR Sent.                                                                            tw5 

22:57 Lipase Sent.                                                                            tw5 

22:57 LFT's Sent.                                                                             tw5 

                                                                                             

02:05 Catalina Barrow PA-C is Hospitalizing Provider.                                          cp  

02:28 Donis Briggs is Hospitalizing Provider.                                               cp  

02:52 LAB Add On Sent.                                                                        tw5 

02:52 SARS RAPID Sent.                                                                        jb4 

03:39 Straight cath inserted, using sterile technique, 12 Fr. Specimen obtained. Returned     tw5 

      clear yellow urine.                                                                         

03:39 UDS Sent.                                                                               tw5 

03:39 Urine Microscopic Only Sent.                                                            tw5 

03:40 Patient admitted, IV remains in place.                                                  tw5 

                                                                                                  

Administered Medications:                                                                         

                                                                                             

23:17 Drug: NS 0.9% 1000 ml Route: IV; Rate: 1 bolus; Site: left antecubital;                 tw5 

23:17 Drug: morphine 2 mg Route: IVP; Infused Over: 4 mins; Site: left antecubital;           tw5 

23:18 Not Given (Patient Refused; "I am not feeling nausous."): Reglan (metoCLOPramide) 10   tw5 

      mg IVP once; over 1 to 2 minutes                                                            

23:55 Drug: morphine 2 mg Route: IVP; Infused Over: 4 mins; Site: left antecubital;           jb4 

                                                                                             

00:00 Drug: Benadryl (diphenhydrAMINE) 25 mg Route: IVP; Site: left antecubital;              jb4 

03:39 Follow up: Response: No adverse reaction                                                tw5 

01:00 CANCELLED (Physician Discretion): Ativan (LORazepam) 0.5 mg IVP once                    cp  

01:01 CANCELLED (Physician Discretion): Aspirin Chewable Tablet 324 mg PO once; 81 mg tablets cp  

      x 4                                                                                         

01:09 Drug: Ativan (LORazepam) 1 mg Route: PO;                                                jb4 

03:25 Follow up: Response: No adverse reaction                                                tw5 

01:09 Drug: Metoprolol 25 mg Route: PO;                                                       jb4 

03:26 Follow up: Response: No adverse reaction                                                tw5 

03:03 Drug: Potassium Effervescent Tablet 50 mEq Route: PO;                                   jb4 

03:25 Follow up: Response: No adverse reaction                                                 

03:15 Drug: Ativan (LORazepam) 1 mg Route: IVP; Site: left antecubital;                       jb4 

03:40 Follow up: Response: No adverse reaction                                                 

03:40 Follow up: Response: RASS: Light sedation (-2)                                           

                                                                                                  

                                                                                                  

Medication:                                                                                       

02:15 VIS not applicable for this client.                                                      

                                                                                                  

Outcome:                                                                                          

02:08 Decision to Hospitalize by Provider.                                                    cp  

02:25 Condition: stable                                                                        

02:25 Instructed on                                                                               

03:40 Admitted to ER Hold.  Please see [facility] for further documentation.                     

20:03 Admitted to Med/surg room 218, Report called to  report called to tremayne                

20:37 Patient left the ED.                                                                     

                                                                                                  

Signatures:                                                                                       

Isael Cano PA PA cp Bryson, James, HUMBERTO                       RN   frantz                                                  

Ade Amaya                                                                                  

                                                                                                  

Corrections: (The following items were deleted from the chart)                                    

02:26 02:25 Reassessment: Bri [phone]. tw 

03:25 02:45 Neuro: Level of Consciousness is obtunded,  

                                                                                                  

**************************************************************************************************

## 2023-02-22 NOTE — RAD REPORT
EXAM DESCRIPTION:  CT - Head Brain Wo Cont - 2/22/2023 6:54 am

 

CLINICAL HISTORY:  MENTAL STATUS CHANGE

 

TECHNIQUE:  Axial computed tomography images of the head/brain without intravenous contrast.   Sagitt
al and coronal reformatted images were created and reviewed.   This CT exam was performed using one o
r more of the following dose reduction techniques:   automated exposure control, adjustment of the mA
 and/or kV according to patient size, and/or use of iterative reconstruction technique.

 

COMPARISON:  CT Head dated 3/30/2022

 

FINDINGS:  Brain:   Unremarkable.   No hemorrhage.   No significant white matter disease.   No edema.


Ventricles:   Unremarkable.   No ventriculomegaly.

Bones/joints:   Unremarkable.   No acute fracture.

Soft tissues:   Unremarkable.

Sinuses:   Unremarkable as visualized.   No acute sinusitis.

Mastoid air cells:   Unremarkable as visualized.   No mastoid effusion.

 

IMPRESSION:  No acute hemorrhage, focal mass or large territory infarction.

 

Electronically signed by:   Melinda Cade MD   2/22/2023 3:35 AM CST Workstation: [id number]

 

 

Due to temporary technical issues with the PACS/Fluency reporting system, reports are being signed by
 the in house radiologists without review as a courtesy to insure prompt reporting. The interpreting 
radiologist is fully responsible for the content of the report.

## 2023-02-22 NOTE — RAD REPORT
EXAM DESCRIPTION:  CT - Facial Bones W/ Mpr - 2/22/2023 6:53 am

 

   CT Maxillofacial Without Intravenous Contrast

CLINICAL HISTORY:

   The patient is 57 years old and is Female; JAW PAIN

TECHNIQUE:

   Axial computed tomography images of the face without intravenous contrast.   Sagittal and coronal 
reformatted images were created and reviewed.   This CT exam was performed using one or more of the f
ollowing dose reduction techniques:   automated exposure control, adjustment of the mA and/or kV acco
rding to patient size, and/or use of iterative reconstruction technique.

COMPARISON:

   No relevant prior studies available.

FINDINGS:

   ARTIFACTS:   The exam is suboptimal secondary to motion artifact.

   BONES/JOINTS:   The orbital floors and walls are intact.   The zygomatic arches and pterygoid plat
es are intact.   The visualized maxilla and mandible are intact.

   SOFT TISSUES:   Unremarkable.

   ORBITS:   The globes, extraocular muscles, and optic nerve complexes are within normal limits.

   SINUSES:   The visualized paranasal sinuses are clear.   No air-fluid levels.

   DENTAL:   A large 1.9 x 1.7 cm lucent lesion with central calcification, likely residual tooth is 
noted within the left mandibular body. There appears to be disruption of the mandible medially at thi
s level.

   NASAL CAVITY/SEPTUM:   The nasal bones are intact.

 

IMPRESSION:  Findings suggest either a large periapical abscess involving a left molar mandibular too
th versus a large dentigerous cyst which may be infected.

 

Electronically signed by:   Neda Todd MD   2/22/2023 3:38 AM CST Workstation: [id number]

 

 

 

Due to temporary technical issues with the PACS/Fluency reporting system, reports are being signed by
 the in house radiologists without review as a courtesy to insure prompt reporting. The interpreting 
radiologist is fully responsible for the content of the report.

## 2023-02-22 NOTE — RAD REPORT
EXAM DESCRIPTION:  RAD - Chest Single View - 2/21/2023 11:33 pm

 

CLINICAL HISTORY:  The patient is 57 years old and is Female; CHEST PAIN

 

TECHNIQUE:  Frontal view of the chest.

 

COMPARISON:  No relevant prior studies available.

 

FINDINGS:  Lungs:   Unremarkable.   No consolidation.

   Pleural space:   Unremarkable.   No pneumothorax.

   Heart:   Unremarkable.

   Mediastinum:   Unremarkable.

   Bones/joints:   Unremarkable.

 

IMPRESSION:  No acute findings in the chest.

 

Electronically signed by:   Perfecto Osorio MD   2/21/2023 11:41 PM CST Workstation: [id number]

 

 

Due to temporary technical issues with the PACS/Fluency reporting system, reports are being signed by
 the in house radiologists without review as a courtesy to insure prompt reporting. The interpreting 
radiologist is fully responsible for the content of the report.

## 2023-02-22 NOTE — RAD REPORT
EXAM DESCRIPTION:  RAD - Hip Right 2 View - 2/22/2023 1:45 am

 

CLINICAL HISTORY:  The patient is 57 years old and is Female; PAIN Hip Right 2 View

 

TECHNIQUE:  Two or three views of the right hip with pelvis when performed.

 

COMPARISON:  No relevant prior studies available.

 

FINDINGS:  BONES/JOINTS:   The femoral head is located. The right SI joint and pubic symphysis are in
tact without evidence of diastases.   No acute fracture.   No dislocation.

   SOFT TISSUES:   Unremarkable.

 

IMPRESSION:  No acute findings in the right hip.

 

Electronically signed by:   Neda Todd MD   2/22/2023 1:57 AM CST Workstation: [id number]

 

 

Due to temporary technical issues with the PACS/Fluency reporting system, reports are being signed by
 the in house radiologists without review as a courtesy to insure prompt reporting. The interpreting 
radiologist is fully responsible for the content of the report.

## 2023-02-22 NOTE — CON
Date of Consultation:  02/22/2023



Reason For Consultation:  Chest pain.



History Of Present Illness:  This is a 57-year-old female, history of bipolar disorder, hypertension,
 hypothyroidism, heavy alcohol abuse, presented to the emergency room with chest pain.  Upon evaluati
on, patient was completely lethargic, difficult to wake up, but she finally woke up and then I interv
iewed her and she said she has been getting chest pains on and off, not related to exertion specifica
lly, and at the time of interview, she was chest pain free.



Past Medical History:  As outlined above in the HPI.



Medications:  Refer to reconciliation sheet for detailed list.



Allergies:  PENICILLIN AND SULFA.



Family History:  No premature coronary artery disease or cancer.



Social History:  She is an active smoker.  Does not __________ use any drugs.



Review of Systems:

All systems reviewed and they were negative except as mentioned in HPI and she is alcoholic.



Physical Examination:

Vital Signs:  Reviewed. 

Head and Neck:  Pupils are equal, reactive to light.  Intact eye movements.  No JVD.  No cervical lym
phadenopathy.  Neck is supple.  Thyroid is not enlarged. 

Lungs:  Clear to auscultation bilaterally.  No rhonchi, wheezing, or crackles.  No accessory muscle u
se. 

Heart:  Regular rate and rhythm.  No extra sounds. 

Abdomen:  Soft, nontender.  Bowel sounds positive.  No organomegaly.  No masses or hernia.  No rigidi
ty or rebound. 

EXTREMITIES:  Edema bilaterally.  No clubbing or cyanosis.  Intact pulses. 

Skin:  No rash. 

Neurologic:  Alert, awake, oriented x3.  No acute focal deficits appreciated. 

Lymph Nodes:  No cervical or axillary adenopathy.



Investigations:  Her NT-proBNP is 5608.  Troponins are negative.



Assessment And Recommendation:  

1.Chest pain.  It is atypical.  Negative troponin, but NT-proBNP is elevated.  Obtain an echo and if
 troponin continues to be negative, we will plan for outpatient stress test.

2.Chronic alcoholism and she appears to be having some withdrawal symptoms.  She is very lethargic a
nd being managed with Ativan for detox protocol.





SR/MODL

DD:  02/22/2023 18:36:35Voice ID:  [id number]

DT:  02/22/2023 23:48:17Report ID:  [id number]

## 2023-02-22 NOTE — P.PN
Date of Service: 02/22/23


Patient was given Ativan this morning and has been sleeping most of the day.  

She is easily arousable and currently denies any complaint.





Diagnosis:


Chest pain


Accelerated junctional rhythm.


Methamphetamine and alcohol abuse


Metabolic encephalopathy





Plan:


Patient is currently easily arousable.


Troponin is negative.  ACS unlikely.


Cardiology seen patient.


Echocardiogram was done and result is pending.


Neurochecks.


Awaiting cardiology recommendation.

## 2023-02-22 NOTE — RAD REPORT
EXAM DESCRIPTION:  CT - Angio  Aorta For Dissection - 2/22/2023 6:55 am

 

CLINICAL HISTORY:  57 years   Female   chest pain

 

COMPARISON:  None

 

TECHNIQUE:  Images were obtained in axial, sagittal, and coronal planes. Intravenous contrast was adm
inistered. 3-D MIP imaging was performed.

   This exam was performed according to our departmental dose-optimization program which includes use
 of Automated Exposure Control, adjustment of the mA and/or kV according to patient size and/or use o
f iterative reconstruction technique.

 

FINDINGS:  CT chest: Patent thoracic aorta with no evidence for dilatation or dissection. Great vesse
ls at their origins from the thoracic aorta. No filling defects pulmonary arteries bilaterally. No pe
ricardial or pleural effusions bilaterally. No adenopathy. No lung parenchymal infiltrates or nodules
 seen. No acute osseous abnormality involving the thorax.

CT abdomen and pelvis: Patent thoracoabdominal and abdominal aorta with tortuosity seen. Patent bilat
eral common, internal, and external iliac arteries. No evidence for thrombus, dissection, hemodynamic
ally significant greater than 50% stenosis, or aneurysm. Patent common femoral arteries bilaterally. 
Patent celiac, superior mesenteric, and inferior mesenteric arteries. Patent renal arteries bilateral
ly. No abnormality involving the liver, pancreas, gallbladder, and adrenal glands bilaterally. Flow a
rtifact involving spleen. No obstructing renal or ureteral calcifications bilaterally. No hydronephro
sis bilaterally. Unremarkable bladder. Appendix not well identified however no secondary signs for ap
pendicitis. No bowel obstruction, perforation, or inflammation. Unremarkable portal vein. No adenopat
hy or abnormal fluid collections seen. No acute osseous abnormality involving the abdomen or pelvis.

 

IMPRESSION:  Thoracic and abdominal aorta within normal limits. No evidence for dissection, aneurysm,
 thrombus, or stenosis.

No localizing intrathoracic or intra-abdominal abnormality.

 

Electronically signed by:   Jane Hicks MD   2/22/2023 12:25 AM CST Workstation: [id number]

 

 

Due to temporary technical issues with the PACS/Fluency reporting system, reports are being signed by
 the in house radiologists without review as a courtesy to insure prompt reporting. The interpreting 
radiologist is fully responsible for the content of the report.

## 2023-02-22 NOTE — EDPHYS
Physician Documentation                                                                           

 [facility]                                                                 

Name: Leanne Canseco                                                                               

Age: 57 yrs                                                                                       

Sex: Female                                                                                       

: 1965                                                                                   

MRN: [id number]                                                                                   

Arrival Date: 2023                                                                          

Time: 22:06                                                                                       

Account#: [id number]                                                                            

Bed 3                                                                                             

Private MD:                                                                                       

ED Physician Jordin Rodriguez                                                                       

HPI:                                                                                              

                                                                                             

22:20 This 57 yrs old Female presents to ER via EMS with complaints of Chest Pain.            cp  

22:20 The patient or guardian reports chest pain that is located primarily in the substernal  cp  

      area.                                                                                       

22:20 Onset: today. The pain does not radiate. Associated signs and symptoms: Pertinent       cp  

      positives: abdominal pain, shortness of breath, back pain, Pertinent negatives: cough,      

      diaphoresis, lower extremity swelling, syncope, vomiting. The chest pain is described       

      as constant. Duration: The patient or guardian reports a single episode, that is still      

      ongoing, and unchanged.                                                                     

                                                                                                  

OB/GYN:                                                                                           

22:16 LMP N/A - Post-menopause                                                                tw5 

                                                                                                  

Historical:                                                                                       

- Allergies:                                                                                      

22:16 PENICILLINS;                                                                            tw5 

22:16 Sulfa (Sulfonamide Antibiotics);                                                        tw5 

- PMHx:                                                                                           

22:16 Anxiety; Hypertension; Hypothyroidism;                                                  tw5 

                                                                                                  

- Immunization history:: Flu vaccine is not up to date.                                           

- Social history:: Smoking status: Patient denies any tobacco usage or history of.                

                                                                                                  

                                                                                                  

ROS:                                                                                              

22:25 Constitutional: Negative for body aches, chills, fever, poor PO intake.                 cp  

22:25 Eyes: Negative for injury, pain, redness, and discharge.                                cp  

22:25 ENT: Negative for drainage from ear(s), ear pain, sore throat, difficulty swallowing,       

      difficulty handling secretions.                                                             

22:25 Cardiovascular: Positive for chest pain, Negative for edema, palpitations.                  

22:25 Respiratory: Negative for cough, shortness of breath, wheezing.                             

22:25 Abdomen/GI: Positive for abdominal pain, Negative for vomiting, diarrhea, constipation.     

22:25 Back: Positive for radiated pain.                                                           

22:25 : Negative for urinary symptoms.                                                          

22:25 Neuro: Negative for altered mental status, dizziness, headache, numbness, weakness.         

22:25 All other systems are negative.                                                             

                                                                                                  

Exam:                                                                                             

22:30 ECG was reviewed by the Attending Physician.                                            cp  

22:32 Constitutional: The patient appears in no acute distress, alert, awake,                 cp  

      non-diaphoretic, non-toxic, well developed, well nourished, uncomfortable.                  

22:32 Head/Face:  Normocephalic, atraumatic.                                                  cp  

22:32 Eyes: Periorbital structures: appear normal, Pupils: equal, round, and reactive to          

      light and accomodation, Extraocular movements: intact throughout, Conjunctiva: normal,      

      no exudate, no injection, Sclera: no appreciated abnormality, Lids and lashes: appear       

      normal, bilaterally.                                                                        

22:32 ENT: External ear(s): are unremarkable, Nose: is normal, Mouth: Lips: moist, Oral           

      mucosa: moist, Posterior pharynx: Airway: no evidence of obstruction, patent, swelling,     

      is not appreciated, erythema, is not appreciated, Dental exam: abscess, is not              

      appreciated, dental caries, that is severe, diffusely.                                      

22:32 Neck: ROM/movement: is normal, is supple, without pain, no range of motions limitations.    

22:32 Chest/axilla: Inspection: normal.                                                           

22:32 Cardiovascular: Rate: tachycardic, Rhythm: regular, Heart sounds: murmur, systolic,         

      Edema: is not appreciated, JVD: is not appreciated.                                         

22:32 Respiratory: the patient does not display signs of respiratory distress,  Respirations:     

      normal, no use of accessory muscles, no retractions, labored breathing, is not present,     

      Breath sounds: are clear throughout, no decreased breath sounds, no stridor, no             

      wheezing.                                                                                   

22:32 Abdomen/GI: Inspection: abdomen appears normal, Bowel sounds: active, all quadrants,        

      Palpation: soft, in all quadrants, moderate abdominal tenderness, in the epigastric         

      area, rebound tenderness, is not appreciated, involuntary guarding, is not appreciated.     

22:32 Back: CVA tenderness, is absent, vertebral tenderness, is not appreciated.                  

22:32 Neuro: Orientation: to person, place, situation, Mentation: able to follow commands,        

      Motor: moves all fours, strength is normal.                                                 

                                                                                                  

Vital Signs:                                                                                      

22:06  / 99; Pulse 130; Resp 24; Temp 97.8; Pulse Ox 97% on R/A; Weight 61.69 kg;       tw5 

      Height 5 ft. 2 in. (157.48 cm); Pain 7/10;                                                  

23:13  / 98; Pulse 126; Resp 18; Pulse Ox 100% on R/A;                                  tw                                                                                             

01:00  / 95; Pulse 119; Resp 16; Pulse Ox 100% on R/A;                                  jb4 

02:15  / 86; Pulse 114; Resp 22; Pulse Ox 100% on R/A;                                  tw5 

03:18 BP 98 / 78; Pulse 109; Resp 19; Pulse Ox 98% on R/A;                                    jb4 

03:38 BP 97 / 76; Pulse 100; Resp 14; Temp 99.7(R); Pulse Ox 100% ;                           tw5 

                                                                                             

22:06 Body Mass Index 24.87 (61.69 kg, 157.48 cm)                                             tw5 

                                                                                                  

MDM:                                                                                              

                                                                                             

22:37 Patient medically screened.                                                             cp  

22:37 Patient medically screened.                                                             cp  

23:00 Differential diagnosis: acute myocardial infarction, acute pericarditis, pancreatitis,  cp  

      pneumonia, pneumothorax, pulmonary embolus, stable angina, thoracic aortic disection,       

      unstable angina.                                                                            

                                                                                             

02:00 Data reviewed: vital signs, nurses notes, lab test result(s), EKG, radiologic studies,  cp  

      CT scan, plain films.                                                                       

02:00 Management of patient was discussed with the following: Hospitalist: Rolanda Barrow NP.  cp  

      Historians other than the Patient: Daughter/Son: daughter assists with HPI. Care            

      significantly affected by the following chronic conditions: Hypertension. Counseling: I     

      had a detailed discussion with the patient and/or guardian regarding: the historical        

      points, exam findings, and any diagnostic results supporting the discharge/admit            

      diagnosis, lab results, radiology results, the need for further work-up and treatment       

      in the hospital. Response to treatment: the patient's symptoms have mildly improved         

      after treatment.                                                                            

                                                                                                  

                                                                                             

22:18 Order name: Basic Metabolic Panel                                                       tw                                                                                             

22:18 Order name: CBC with Diff                                                                                                                                                            

22:18 Order name: NT PRO-BNP                                                                                                                                                               

22:18 Order name: Troponin HS                                                                 tw                                                                                             

22:47 Order name: Basic Metabolic Panel; Complete Time: 00:26                                 EDMS

                                                                                             

00:27 Interpretation: Normal except: K 3.1; GLUC 107; GFR 65.                                 cp  

                                                                                             

22:47 Order name: Troponin High Sensitivity; Complete Time: 00:26                             EDMS

                                                                                             

01:46 Interpretation: Abnormal: Troponin HS 54.3.                                             cp  

                                                                                             

22:47 Order name: NT PRO-BNP; Complete Time: 00:26                                            EDMS

                                                                                             

22:52 Interpretation: Abnormal: NT PRO-BNP 5608.                                              cp  

                                                                                             

22:52 Order name: LFT's                                                                       cp  

                                                                                             

22:52 Order name: Lipase                                                                      cp  

                                                                                             

22:52 Order name: PT-INR                                                                      cp  

                                                                                             

22:54 Order name: CBC with Automated Diff; Complete Time: 00:26                               EDMS

                                                                                             

00:27 Interpretation: Normal except: MPV 7.5.                                                 cp  

                                                                                             

23:10 Order name: Liver (Hepatic) Function; Complete Time: 00:26                              EDMS

                                                                                             

00:28 Interpretation: Normal except: AST 52; ALT 61; TP 6.2; ALB 3.3.                         cp  

                                                                                             

23:10 Order name: Lipase; Complete Time: 00:26                                                EDMS

                                                                                             

23:25 Order name: Protime (+INR); Complete Time: 00:26                                        EDMS

                                                                                             

22:18 Order name: XRAY Chest (1 view)                                                         tw5 

                                                                                             

22:53 Order name: CT Aorta for Dissection                                                     cp  

                                                                                             

00:30 Order name: Urine Microscopic Only                                                      cp  

                                                                                             

00:30 Order name: UDS                                                                         cp  

                                                                                             

00:30 Order name: ETOH Level                                                                  cp  

                                                                                             

01:02 Order name: XRAY Hip RIGHT 2 view                                                       cp  

                                                                                             

01:49 Order name: SARS RAPID                                                                  cp  

                                                                                             

02:39 Order name: LAB Add On                                                                  cp  

                                                                                             

02:52 Order name: Alcohol Serum/Plasma; Complete Time: 03:11                                  EDMS

                                                                                             

02:53 Order name: SARS-COV-2 Antigen Rapid; Complete Time: 03:11                              EDMS

                                                                                             

04:08 Order name: Urine Drug Screen; Complete Time: 06:25                                     EDMS

                                                                                             

04:42 Order name: Urine Microscopic Only; Complete Time: 06:25                                EDMS

                                                                                             

05:41 Order name: Troponin High Sensitivity; Complete Time: 06:25                             EDMS

                                                                                             

05:41 Order name: Lipid Profile; Complete Time: 06:25                                         EDMS

                                                                                             

05:41 Order name: Thyroid Stimulating Hormone; Complete Time: 06:25                           EDMS

                                                                                             

05:55 Order name: T4 Free; Complete Time: 06:25                                               EDMS

                                                                                             

22:18 Order name: EKG; Complete Time: 22:19                                                   tw5 

                                                                                             

22:18 Order name: Cardiac monitoring; Complete Time: 22:                                                                                             

22:18 Order name: EKG - Nurse/Tech; Complete Time: :                                                                                             

22:18 Order name: IV Saline Lock; Complete Time: :                                                                                             

22:18 Order name: Labs collected and sent; Complete Time: :                                                                                             

22:18 Order name: O2 Per Protocol; Complete Time: :                                                                                             

22:18 Order name: O2 Sat Monitoring; Complete Time: :                                                                                             

00:30 Order name: Urine Dipstick-Ancillary (obtain specimen); Complete Time: 03:39            cp  

                                                                                             

02:14 Order name: CT Head Brain wo Cont                                                       cp  

                                                                                             

11:48 Order name: CT                                                                          EDMS

                                                                                             

13:16 Order name: CT                                                                          EDMS

                                                                                             

13:41 Order name: RAD                                                                         EDMS

                                                                                                  

EC/21                                                                                             

22:30 Rate is 124 beats/min. Rhythm is regular. QRS interval is normal. QT interval is        cp  

      normal. Interpreted by me. Reviewed by me.                                                  

                                                                                                  

Administered Medications:                                                                         

23:17 Drug: NS 0.9% 1000 ml Route: IV; Rate: 1 bolus; Site: left antecubital;                 tw5 

23:17 Drug: morphine 2 mg Route: IVP; Infused Over: 4 mins; Site: left antecubital;           tw 

23:18 Not Given (Patient Refused; "I am not feeling nausous."): Reglan (metoCLOPramide) 10   tw5 

      mg IVP once; over 1 to 2 minutes                                                            

23:55 Drug: morphine 2 mg Route: IVP; Infused Over: 4 mins; Site: left antecubital;           jb4 

                                                                                             

00:00 Drug: Benadryl (diphenhydrAMINE) 25 mg Route: IVP; Site: left antecubital;              jb4 

03:39 Follow up: Response: No adverse reaction                                                 

01:00 CANCELLED (Physician Discretion): Ativan (LORazepam) 0.5 mg IVP once                    cp  

01:01 CANCELLED (Physician Discretion): Aspirin Chewable Tablet 324 mg PO once; 81 mg tablets cp  

      x 4                                                                                         

01:09 Drug: Ativan (LORazepam) 1 mg Route: PO;                                                jb4 

03:25 Follow up: Response: No adverse reaction                                                tw5 

01:09 Drug: Metoprolol 25 mg Route: PO;                                                       jb4 

03:26 Follow up: Response: No adverse reaction                                                tw5 

03:03 Drug: Potassium Effervescent Tablet 50 mEq Route: PO;                                   jb4 

03:25 Follow up: Response: No adverse reaction                                                tw5 

03:15 Drug: Ativan (LORazepam) 1 mg Route: IVP; Site: left antecubital;                       jb4 

03:40 Follow up: Response: No adverse reaction                                                tw5 

03:40 Follow up: Response: RASS: Light sedation (-2)                                          tw5 

                                                                                                  

                                                                                                  

Disposition Summary:                                                                              

23 02:08                                                                                    

Hospitalization Ordered                                                                           

      Hospitalization Status: Observation                                                     cp  

      Condition: Stable                                                                       cp  

      Problem: new                                                                            cp  

      Symptoms: have improved                                                                 cp  

      Bed/Room Type: Standard                                                                 cp  

      Provider: Donis Briggs(23 02:28)                                                cp  

      Location: Telemetry/MedSurg (observation)(23 16:38)                               em1 

      Room Assignment: [facility](23 16:38)                                                    em1 

      Diagnosis                                                                                   

        - Chest pain, unspecified                                                             cp  

      Forms:                                                                                      

        - Medication Reconciliation Form                                                      cp  

        - SBAR form                                                                           cp  

Signatures:                                                                                       

Dispatcher [employer]                           EDMS                                                 

Jonathan Blanc                               em1                                                  

Isael Cano PA PA cp Garcia, Cindy, RN RN cg Bryson, James, RN                       RN   Ade Bains                                tw5                                                  

Catalina Barrow PA-C                     PAEDI sb4                                                  

                                                                                                  

Corrections: (The following items were deleted from the chart)                                    

01:00 00:58 Ativan (LORazepam) 0.5 mg IVP once ordered. cp                                    cp  

01:01 01:01 Aspirin Chewable Tablet 324 mg PO once; 81 mg tablets x 4 ordered. cp             cp  

02:28 02:08 Catalina Barrow cp                                                                  cp  

02:50 02:08 Telemetry/MedSurg (observation) cp                                                cg  

02:50 02:08 cp                                                                                cg  

03:39 00:30 Urine Pregnancy Test ordered. cp                                                  tw5 

16:38 02:50 [facility] ER HOLD cg                                                                   em1 

16:38 02:50 ERHOLD- cg                                                                        em1 

                                                                                                  

**************************************************************************************************

## 2023-02-23 LAB
ALBUMIN SERPL BCP-MCNC: 2.9 G/DL (ref 3.4–5)
ALP SERPL-CCNC: 65 U/L (ref 45–117)
ALT SERPL W P-5'-P-CCNC: 51 U/L (ref 13–56)
AST SERPL W P-5'-P-CCNC: 38 U/L (ref 15–37)
BUN BLD-MCNC: 18 MG/DL (ref 7–18)
GLUCOSE SERPLBLD-MCNC: 131 MG/DL (ref 74–106)
HCT VFR BLD CALC: 40.3 % (ref 36–45)
LYMPHOCYTES # SPEC AUTO: 2.2 K/UL (ref 0.7–4.9)
MAGNESIUM SERPL-MCNC: 1.9 MG/DL (ref 1.6–2.4)
MCV RBC: 85.7 FL (ref 80–100)
PMV BLD: 7.8 FL (ref 7.6–11.3)
POTASSIUM SERPL-SCNC: 3.6 MMOL/L (ref 3.5–5.1)
RBC # BLD: 4.71 M/UL (ref 3.86–4.86)

## 2023-02-23 RX ADMIN — Medication SCH ML: at 08:39

## 2023-02-23 RX ADMIN — ATORVASTATIN CALCIUM SCH MG: 40 TABLET, FILM COATED ORAL at 20:44

## 2023-02-23 RX ADMIN — LEVOTHYROXINE SODIUM SCH MG: 0.05 TABLET ORAL at 08:33

## 2023-02-23 RX ADMIN — LORAZEPAM SCH: 1 TABLET ORAL at 00:38

## 2023-02-23 RX ADMIN — LORAZEPAM SCH: 1 TABLET ORAL at 16:38

## 2023-02-23 RX ADMIN — ENOXAPARIN SODIUM SCH MG: 40 INJECTION SUBCUTANEOUS at 08:33

## 2023-02-23 RX ADMIN — METOPROLOL SUCCINATE SCH MG: 25 TABLET, EXTENDED RELEASE ORAL at 20:44

## 2023-02-23 RX ADMIN — ASPIRIN SCH MG: 81 TABLET, COATED ORAL at 08:33

## 2023-02-23 RX ADMIN — LORAZEPAM SCH MG: 1 TABLET ORAL at 08:37

## 2023-02-23 RX ADMIN — LORAZEPAM SCH: 1 TABLET ORAL at 12:38

## 2023-02-23 RX ADMIN — Medication SCH ML: at 21:00

## 2023-02-23 RX ADMIN — LORAZEPAM SCH: 1 TABLET ORAL at 04:38

## 2023-02-23 RX ADMIN — LORAZEPAM SCH: 1 TABLET ORAL at 20:38

## 2023-02-23 NOTE — ECHO
HEIGHT: 5 ft 2 in   WEIGHT: 136 lb 0.051 oz   DATE OF STUDY: 02/22/2023   REFER DR: Catalina Barrow

2-DIMENSIONAL: YES

     M.MODE: YES

 DOPPLER: YES

COLOR FLOW: YES



                    TDS:  

PORTABLE: YES

 DEFINITY:  

BUBBLE STUDY: 





DIAGNOSIS:  CHEST PAIN, CONGESTIVE HEART FAILURE



CARDIAC HISTORY:  

CATHERIZATION: NO

SURGERY: NO

PROSTHETIC VALVE: NO

PACEMAKER: NO





MEASUREMENTS (cm)

    DIASTOLIC (NORMALS)                 SYSTOLIC (NORMALS)

IVSd                 1.0 (0.6-1.2)                    LA Diam 2.6 (1.9-4.0)     LVEF       
  21%  

LVIDd               4.7 (3.5-5.7)                        LVIDs      4.2 (2.0-3.5)     %FS  
        10%

LVPWd             1.0 (0.6-1.2)

Ao Diam           2.6 (2.0-3.7)



2 DIMENSIONAL ASSESSMENT:

RIGHT ATRIUM:                   NORMAL

LEFT ATRIUM:       NORMAL



RIGHT VENTRICLE:            NORMAL

LEFT VENTRICLE: DEPRESSED EJECTION FRACTION 



TRICUSPID VALVE:  MODERATE TRICUSPID REGURGITATION

MITRAL VALVE:     MILD MITRAL REGURGITATION



PULMONIC VALVE:  MILD PULMONIC INSUFFICIENCY

AORTIC VALVE:     NORMAL



PERICARDIAL EFFUSION: NONE

AORTIC ROOT:      NORMAL





LEFT VENTRICULAR WALL MOTION:     SEVERE GLOBAL HYPOKINESIS



DOPPLER/COLOR FLOW:     SEE BELOW



COMMENTS:      

  1. SEVERELY DEPRESSED LEFT VENTRICULAR EJECTION FRACTION 20-25%

  2. SEVERE GLOBAL HYPOKINESIS

  3. MILD MITRAL REGURGITATION

  4. MODERATE TRICUSPID REGURGITATION

  5. SEVERE DIASTOLIC DYSFUNCTION



TECHNOLOGIST:   ANDREA SMITH

## 2023-02-23 NOTE — PN
Date of Progress Note:  02/23/2023



Subjective:  Seen by bedside.  She is doing well.  Still lethargic.



Review of Systems:

No chest pain.  No shortness of breath, orthopnea, or cough.  No nausea, vomiting, diarrhea.  All oth
er systems reviewed are negative.



Physical Examination:

Vital Signs:  Reviewed. 

Head and Neck:  Pupils are equal, reactive to light.  Intact eye movements.  No JVD.  No cervical lym
phadenopathy. 

Neck:  Supple.  Thyroid is not enlarged. 

Lungs:  Clear to auscultation bilaterally.  No rhonchi, wheezing, or crackles.  No accessory muscle u
se. 

Heart:  Regular rate and rhythm.  No extra sounds. 

Abdomen:  Soft, nontender.  Bowel sounds positive.  No organomegaly.  No masses or hernia.  No rigidi
ty or rebound. 

Extremities:  No edema, clubbing, cyanosis.  Intact pulses. 

Skin:  No rash. 

Neurologic:  Alert, awake, oriented x3.  No acute focal deficits appreciated.



Investigations:  Labs were reviewed.



Assessment And Recommendation:  

1.Severe systolic heart failure, acute onset, likely related to alcohol.  Obtain a Lexiscan nuclear 
stress test to rule out ischemia.  Patient was started appropriately on lisinopril.  Add Toprol-XL 25
 mg daily and adjust to maximum dose as the blood pressure tolerates.  She will probably be a good ca
ndidate for Entresto if she quits drinking alcohol.

2.Chronic alcoholism.  She was counseled in detail to monitor for 

withdrawal.

3.Elevated troponin.  Ischemia workup as above.





SR/MODL

DD:  02/23/2023 12:12:35Voice ID:  [id number]

DT:  02/23/2023 14:46:23Report ID:  [id number]

## 2023-02-23 NOTE — EKG
Test Date:    2023-02-21               Test Time:    22:25:12

Technician:   ANTONIA                                    

                                                     

MEASUREMENT RESULTS:                                       

Intervals:                                           

Rate:         124                                    

AK:                                                  

QRSD:         90                                     

QT:           414                                    

QTc:          594                                    

Axis:                                                

P:                                                   

AK:                                                  

QRS:          94                                     

T:            43                                     

                                                     

INTERPRETIVE STATEMENTS:                                       

                                                     

sinus tachycardia

Rightward axis

T wave abnormality, consider inferior ischemia

Abnormal ECG

Compared to ECG 02/27/2022 22:25:15

Right-axis deviation now present

T-wave abnormality now present

Possible ischemia now present



Electronically Signed On 02-23-23 16:30:17 CST by George Swartz

## 2023-02-23 NOTE — P.PN
Subjective


Date of Service: 02/23/23


Primary Care Provider: [facility]


Chief Complaint: Chest Pain


Patient is more awake and alert today.


She denies any complaint.


She finished her breakfast.








Physical Examination





- Vital Signs


Temperature: 97.6 F


Blood Pressure: 114/85


Pulse: 101


Respirations: 16


Pulse Ox (%): 97





- Physical Exam


General: Alert, In no apparent distress, Oriented x3


Neck: JVD not distended


Respiratory: Clear to auscultation bilaterally, Normal air movement


Cardiovascular: No edema, Regular rate/rhythm, Normal S1 S2, No murmurs


Gastrointestinal: Normal bowel sounds, Soft and benign, Non-distended, No 

tenderness


Musculoskeletal: No swelling


Integumentary: No rashes


Neurological: Normal strength at 5/5 x4 extr, Cranial nerves 3-12 intact





Assessment And Plan





- Current Problems (Diagnosis)


(1) Amphetamine abuse


Current Visit: Yes   Status: Acute   





(2) Chest pain


Current Visit: Yes   Status: Acute   


Qualifiers: 


   Chest pain type: unspecified   Qualified Code(s): R07.9 - Chest pain, 

unspecified   





(3) Alcohol abuse


Current Visit: Yes   Status: Chronic   





(4) Bipolar disorder (manic depression)


Current Visit: Yes   Status: Chronic   


Qualifiers: 


   Active/Remission status: currently active   Current bipolar episode type: 

mixed   Current episode severity: unspecified   Qualified Code(s): F31.60 - 

Bipolar disorder, current episode mixed, unspecified   





(5) Hypertension


Current Visit: Yes   Status: Chronic   


Qualifiers: 


   Hypertension type: primary hypertension   Qualified Code(s): I10 - Essential 

(primary) hypertension   





(6) Hypothyroidism


Current Visit: Yes   Status: Chronic   


Qualifiers: 


   Hypothyroidism type: unspecified   Qualified Code(s): E03.9 - Hypothyroidism,

unspecified   





(7) Systolic heart failure


Current Visit: Yes   Status: Acute   





- Plan


Echocardiogram shows systolic heart failure of unknown acuity.


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


Daughter also reports a history of prolonged QT syndrome.


Cardiology planning nuclear stress test.


Monitor for alcohol withdrawal syndrome.


CIWA initiated.


Monitor and replete electrolytes.

## 2023-02-24 LAB
BUN BLD-MCNC: 18 MG/DL (ref 7–18)
GLUCOSE SERPLBLD-MCNC: 103 MG/DL (ref 74–106)
POTASSIUM SERPL-SCNC: 3.9 MMOL/L (ref 3.5–5.1)

## 2023-02-24 RX ADMIN — LORAZEPAM SCH: 1 TABLET ORAL at 10:00

## 2023-02-24 RX ADMIN — Medication SCH ML: at 21:00

## 2023-02-24 RX ADMIN — LORAZEPAM SCH MG: 1 TABLET ORAL at 21:40

## 2023-02-24 RX ADMIN — METOPROLOL SUCCINATE SCH MG: 25 TABLET, EXTENDED RELEASE ORAL at 21:40

## 2023-02-24 RX ADMIN — LORAZEPAM SCH: 1 TABLET ORAL at 16:00

## 2023-02-24 RX ADMIN — LORAZEPAM SCH: 1 TABLET ORAL at 00:38

## 2023-02-24 RX ADMIN — LORAZEPAM SCH MG: 1 TABLET ORAL at 12:09

## 2023-02-24 RX ADMIN — Medication SCH ML: at 10:02

## 2023-02-24 RX ADMIN — ASPIRIN SCH MG: 81 TABLET, COATED ORAL at 10:00

## 2023-02-24 RX ADMIN — ENOXAPARIN SODIUM SCH: 40 INJECTION SUBCUTANEOUS at 09:00

## 2023-02-24 RX ADMIN — LEVOTHYROXINE SODIUM SCH: 0.05 TABLET ORAL at 09:00

## 2023-02-24 RX ADMIN — LORAZEPAM SCH MG: 1 TABLET ORAL at 02:08

## 2023-02-24 RX ADMIN — ATORVASTATIN CALCIUM SCH MG: 40 TABLET, FILM COATED ORAL at 21:40

## 2023-02-24 NOTE — P.PN
Subjective


Date of Service: 02/24/23


Primary Care Provider: [facility]


Chief Complaint: Chest Pain


Patient is awake and alert.  She has no new complaint.  She denies any chest 

pain.








Physical Examination





- Vital Signs


Temperature: 98.4 F


Blood Pressure: 137/80


Pulse: 109


Respirations: 16


Pulse Ox (%): 97





Assessment And Plan





- Current Problems (Diagnosis)


(1) Amphetamine abuse


Current Visit: Yes   Status: Acute   





(2) Chest pain


Current Visit: Yes   Status: Acute   


Qualifiers: 


   Chest pain type: unspecified   Qualified Code(s): R07.9 - Chest pain, 

unspecified   





(3) Alcohol abuse


Current Visit: Yes   Status: Chronic   





(4) Bipolar disorder (manic depression)


Current Visit: Yes   Status: Chronic   


Qualifiers: 


   Active/Remission status: currently active   Current bipolar episode type: 

mixed   Current episode severity: unspecified   Qualified Code(s): F31.60 - 

Bipolar disorder, current episode mixed, unspecified   





(5) Hypertension


Current Visit: Yes   Status: Chronic   


Qualifiers: 


   Hypertension type: primary hypertension   Qualified Code(s): I10 - Essential 

(primary) hypertension   





(6) Hypothyroidism


Current Visit: Yes   Status: Chronic   


Qualifiers: 


   Hypothyroidism type: unspecified   Qualified Code(s): E03.9 - Hypothyroidism,

unspecified   





(7) Systolic heart failure


Current Visit: Yes   Status: Acute   





- Plan


Echocardiogram shows systolic heart failure of unknown acuity.


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


Daughter also reports a history of prolonged QT syndrome.


Nuclear stress test reported fixed defect in the inferior wall and dilated left 

ventricle.


Cardiology to follow result.


Monitor for alcohol withdrawal syndrome.


CIWA initiated.


Monitor and replete electrolytes.

## 2023-02-24 NOTE — RAD REPORT
EXAM DESCRIPTION:  NM - Rest Stress Cardiac Imaging - 2/24/2023 11:48 am

 

CLINICAL HISTORY:  NEW CHF

 

COMPARISON:  No comparisons

 

TECHNIQUE:  The patient was administered approximately 11.7 mCi of Tc 99m Sestamibi prior to resting 
SPECT imaging of the heart. The patient was then administered approximately 29.2 mCi of Tc 99m Sestam
ibi following exercise or pharmacologic stress. Multiplanar SPECT images were reviewed.

 

FINDINGS:  The splanchnic uptake more pronounced on the rest than stress images, somewhat limits eval
uation.

 

No definitive stress induced ischemic defect is seen to suggest stress induced ischemia. The small de
fect noted on the stress images at the apical segment of the inferior wall is less conspicuous on the
 rest images, probably related to artifact resulting from splanchnic uptake. Moderate fixed defect al
gina the mid to apical inferior wall is suggestive of an ischemic infarct.

 

Left ventricle is dilated. The end diastolic volume is 186 ml, the end systolic volume is 163 ml, and
 the ejection fraction is 12 %.

 

 

IMPRESSION:  No definitive evidence of stress induced ischemia.

 

Likely artifactual defect more pronounced on the stress images along the apical inferior wall segment
.

 

Moderate-sized fixed defect along the mid apical inferior wall, suggestive of an ischemic infarct.

 

Dilated left ventricle with reduced ejection fraction, 12%.

## 2023-02-24 NOTE — TREADPHA
DX:  NEW CONGESTIVE HEART FAILURE



Date of Study: 02/24/2023





Ht: 5' 2 "

Wt:  136 lb 0.051 oz



Consulting Physician:  PANTERA







MEDICATIONS:  TYLENOL, ASPIRIN, LIPITOR, LOVENOX, ROMAZICON, SYNTHROID, PRINIVIL, ATIVAN, 
MELATONIN, TOPROL XL, ZOFRAN



HISTORY:  57 YEAR OLD FEMALE WITH COMPLAINTS OF CHEST PAIN.  HISTORY OF BOPOLAR, 
HYPERTENSION, AND HYPERLIPIDEMIA



PHYSICIAL EXAMINATION: 

            



RESTING B.P.:  159/96

RESTING H.R.:  99





RESTING EKG:  NORMAL SINUS RHYTHM

            

            

PROTOCOL:  PHARMACOLOGIC

EXERCISE TIME:  3:30 

B.P. AT PEAK STRESS:  185/85





IMPRESSION:  LEXISCAN STRESS TEST PERFORMED AS ORDERED BY PHYSICIAN.  CARDIOLITE INJECTED 
PER PROTOCOL.  SEE NUCLEAR MEDICINE REPORT.  NO SUPRAVENTRICULAR TACHYCARDIA NO 
VENTRICULAR TACHYCARDIA.  NO CHEST PAIN.  NO SHORTNESS OF BREATH NOTED.  PREMATURE 
VENTRICULAR COMPLEXES NOTED DURING PROCEDURE AND POST PROCEDURE.  PATIENT COMPLAINS OF 
HEADACHE.  NO ELECTROCARDIOGRAM CHANGES OF ISCHEMIA WITH LEXISCAN.

## 2023-02-25 VITALS — DIASTOLIC BLOOD PRESSURE: 73 MMHG | TEMPERATURE: 98.1 F | SYSTOLIC BLOOD PRESSURE: 104 MMHG

## 2023-02-25 VITALS — OXYGEN SATURATION: 93 %

## 2023-02-25 LAB
BUN BLD-MCNC: 19 MG/DL (ref 7–18)
GLUCOSE SERPLBLD-MCNC: 98 MG/DL (ref 74–106)
POTASSIUM SERPL-SCNC: 4.4 MMOL/L (ref 3.5–5.1)

## 2023-02-25 RX ADMIN — ASPIRIN SCH MG: 81 TABLET, COATED ORAL at 09:33

## 2023-02-25 RX ADMIN — ENOXAPARIN SODIUM SCH MG: 40 INJECTION SUBCUTANEOUS at 09:33

## 2023-02-25 RX ADMIN — LORAZEPAM SCH: 1 TABLET ORAL at 04:00

## 2023-02-25 RX ADMIN — ONDANSETRON PRN MG: 2 INJECTION INTRAMUSCULAR; INTRAVENOUS at 13:16

## 2023-02-25 RX ADMIN — ONDANSETRON PRN MG: 2 INJECTION INTRAMUSCULAR; INTRAVENOUS at 00:36

## 2023-02-25 RX ADMIN — Medication SCH ML: at 09:34

## 2023-02-25 RX ADMIN — LEVOTHYROXINE SODIUM SCH MG: 0.05 TABLET ORAL at 09:33

## 2023-02-25 RX ADMIN — LORAZEPAM SCH: 1 TABLET ORAL at 09:34

## 2023-02-25 NOTE — P.DS
Admission Date: 02/23/23


Discharge Date: 02/25/23


Primary Care Provider: Caitlyn Dueñas


Disposition: ROUTINE DISCHARGE


Discharge Condition: FAIR


Reason for Admission: Chest Pain





- Problems


(1) Amphetamine abuse


Current Visit: Yes   Status: Acute   





(2) Chest pain


Current Visit: Yes   Status: Acute   


Qualifiers: 


   Chest pain type: unspecified   Qualified Code(s): R07.9 - Chest pain, 

unspecified   





(3) Alcohol abuse


Current Visit: Yes   Status: Chronic   





(4) Bipolar disorder (manic depression)


Current Visit: Yes   Status: Chronic   


Qualifiers: 


   Active/Remission status: currently active   Current bipolar episode type: 

mixed   Current episode severity: unspecified   Qualified Code(s): F31.60 - 

Bipolar disorder, current episode mixed, unspecified   





(5) Hypertension


Current Visit: Yes   Status: Chronic   


Qualifiers: 


   Hypertension type: primary hypertension   Qualified Code(s): I10 - Essential 

(primary) hypertension   





(6) Hypothyroidism


Current Visit: Yes   Status: Chronic   


Qualifiers: 


   Hypothyroidism type: unspecified   Qualified Code(s): E03.9 - Hypothyroidism,

unspecified   





(7) Systolic heart failure


Current Visit: Yes   Status: Acute   


Brief History of Present Illness: 


Patient is a 57 year old female with past medical history of bipolar disorder, 

hypertension, hypothyroidism, and alcohol abuse who presented to the emergency 

department via EMS with complaints of chest pain. Patient reports that she had 

been cleaning a house and was walking up some stairs and started to feel a 

"crushing" chest pain. She states that she was recently diagnosed with 

congestive heart failure. She was noted to be tachycardic upon arrival to ED and

EKG showed an accelerated junctional rhythm. Her labs are significant for 

potassium 3.1, BNP 5600, AST 52, ALT 61, troponin 54.3, UDS positive for opiates

and amphetamines. Chest xray and CTA chest negative. Later on, patient became 

more confused/agitated and only oriented x 1 (oriented x 4 upon arrival). 

Daughter states that she often experiences episodes like this. She also stated 

that patient is a heavy alcohol drinker, occasional THC user, and has not been 

on any of her psych meds for a few months now. Additionally, she has been seeing

an oral surgeon for an abscess/infection in her gums/mandible. Additional 

imaging was obtained- Head CT negative. CT facial bones showed "Findings suggest

either a large periapical abscess involving a left molar mandibular tooth versus

a large dentigerous cyst which may be infected." Patient was hospitalized for 

further management. 





Hospital Course: 


Patient admitted to the medical floor, troponin trended negative.  Patient was 

seen in consultation by cardiology Dr. Swartz.  Echocardiogram was done which 

demonstrated EF of 20 to 25%.  Dr. Swartz recommended nuclear stress test which 

showed fixed defect in the inferior wall and dilated ventricle.  Patient 

diagnosis alcohol induced cardiomyopathy and systolic heart failure.


Daughter also reports a history of prolonged QT syndrome.


Patient was counseled on systolic heart failure, high risk for cardiac 

arrest/ventricular fibrillation.  She was counseled multiple times to quit 

drinking alcohol and to stop abusing methamphetamine and heroin.  She was 

informed risk for abusing these substances to include progressive decline in 

heart function, cardiac arrhythmias which may lead to sudden death.  Patient 

voiced understanding.


She was monitored for symptoms and signs of alcohol withdrawal.  She did not 

develop alcohol withdrawal was syndrome and barely needed Ativan.


No further recommendation per cardiology.


Patient was up and ambulating.  She ate well.


She reports a dental abscess and that she needs to follow-up with an 

orthodontist for surgery and may need anesthesia to evaluate her before surgery.





Patient has clinically improved and deemed stable for discharge.


She was placed on metoprolol, aspirin, lisinopril for systolic heart failure.  

These medications are prescribed on discharge.


She is also prescribed low-dose Ativan taper to help with alcohol withdrawal 

symptoms should she develop any at home.








Vital Signs/Physical Exam: 














Temp Pulse Resp BP Pulse Ox


 


 98.9 F   53   18   100/63   93 


 


 02/25/23 16:00  02/25/23 16:00  02/25/23 16:00  02/25/23 16:00  02/25/23 16:00








General: Alert, In no apparent distress, Oriented x3


HEENT: Mucous membr. moist/pink


Neck: JVD not distended


Respiratory: Clear to auscultation bilaterally, Normal air movement


Cardiovascular: No edema, Regular rate/rhythm, Normal S1 S2


Gastrointestinal: Normal bowel sounds, Soft and benign, Non-distended, No 

tenderness


Musculoskeletal: No swelling


Integumentary: No rashes, No cyanosis


Neurological: Normal strength at 5/5 x4 extr


Laboratory Data at Discharge: 














WBC  6.30 K/uL (4.3-10.9)   02/23/23  03:16    


 


Hgb  13.2 g/dL (12.0-15.0)   02/23/23  03:16    


 


Hct  40.3 % (36.0-45.0)   02/23/23  03:16    


 


Plt Count  290 K/uL (152-406)   02/23/23  03:16    


 


PT  12.3 SECONDS (9.5-12.5)   02/21/23  23:01    


 


INR  1.12   02/21/23  23:01    


 


Sodium  139 mmol/L (136-145)   02/25/23  05:45    


 


Potassium  4.4 mmol/L (3.5-5.1)   02/25/23  05:45    


 


BUN  19 mg/dL (7-18)  H  02/25/23  05:45    


 


Creatinine  0.79 mg/dL (0.55-1.02)   02/25/23  05:45    


 


Glucose  98 mg/dL ()   02/25/23  05:45    


 


Phosphorus  3.3 mg/dL (2.5-4.9)   02/23/23  03:16    


 


Magnesium  1.9 mg/dL (1.6-2.4)   02/23/23  03:16    


 


Total Bilirubin  0.5 mg/dL (0.2-1.0)   02/23/23  03:16    


 


AST  38 U/L (15-37)  H  02/23/23  03:16    


 


ALT  51 U/L (13-56)   02/23/23  03:16    


 


Alkaline Phosphatase  65 U/L ()   02/23/23  03:16    


 


Triglycerides  70 mg/dL (<150)   02/22/23  04:58    


 


Cholesterol  92 mg/dL (<200)   02/22/23  04:58    


 


HDL Cholesterol  50 mg/dL (40-60)   02/22/23  04:58    


 


Cholesterol/HDL Ratio  1.84   02/22/23  04:58    


 


Lipase  Cancelled   02/21/23  22:52    








Home Medications: 








Biotin 1 tab PO DAILY 02/23/23 


Cetirizine HCl [Zyrtec] 10 mg PO DAILY 02/23/23 


Mv-Min/Iron/Folic/Calcium/Vitk [Women's Multivitamin Tablet] 1 tab PO DAILY 

02/23/23 


Vitamin E (Dl,Tocopheryl Acet) [Vitamin E] 1 tab PO EVERY 3RD DAY 02/23/23 


Aspirin [Aspirin EC 81 MG] 81 mg PO DAILY #30 tab 02/25/23 


Atorvastatin Calcium [Lipitor] 40 mg PO BEDTIME #30 tab 02/25/23 


Folic Acid 1 mg PO DAILY #30 tab 02/25/23 


LORazepam [Ativan*] 1 mg PO Q8H #6 tab 02/25/23 


Levothyroxine [Synthroid*] 0.05 mg PO DAILY #30 tab 02/25/23 


Melatonin 10 mg PO BEDTIME PRN PRN #30 tab 02/25/23 


Metoprolol Succinate [Toprol Xl*] 25 mg PO BID #60 tab 02/25/23 


Thiamine HCl 100 mg PO DAILY #30 tab 02/25/23 


lisinopriL [Lisinopril] 20 mg PO DAILY #30 tab 02/25/23 





New Medications: 


Aspirin [Aspirin EC 81 MG] 81 mg PO DAILY #30 tab


LORazepam [Ativan*] 1 mg PO Q8H #6 tab


Folic Acid 1 mg PO DAILY #30 tab


Atorvastatin Calcium [Lipitor] 40 mg PO BEDTIME #30 tab


lisinopriL [Lisinopril] 20 mg PO DAILY #30 tab


Melatonin 10 mg PO BEDTIME PRN PRN #30 tab


 PRN Reason: Insomnia


Levothyroxine [Synthroid*] 0.05 mg PO DAILY #30 tab


Thiamine HCl 100 mg PO DAILY #30 tab


Metoprolol Succinate [Toprol Xl*] 25 mg PO BID #60 tab


Physician Discharge Instructions: 


Patient counseled to stop drinking alcohol and to avoid polysubstance abuse-

methamphetamine and heroin.


Diet: AHA


Activity: Ad cinda


Followup: 


George Swartz MD [ACTIVE - CAN ADMIT] - 1-2 Weeks


Time spent managing pt's care (in minutes): 38

## 2024-11-09 ENCOUNTER — HOSPITAL ENCOUNTER (INPATIENT)
Dept: HOSPITAL 97 - ER | Age: 59
LOS: 3 days | Discharge: HOME | DRG: 871 | End: 2024-11-12
Attending: HOSPITALIST | Admitting: STUDENT IN AN ORGANIZED HEALTH CARE EDUCATION/TRAINING PROGRAM
Payer: MEDICARE

## 2024-11-09 VITALS — BODY MASS INDEX: 24.4 KG/M2

## 2024-11-09 DIAGNOSIS — S01.01XA: ICD-10-CM
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
## 2024-11-09 DIAGNOSIS — N17.9: ICD-10-CM
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
## 2024-11-09 DIAGNOSIS — Z79.899: ICD-10-CM

## 2024-11-09 DIAGNOSIS — I50.21: ICD-10-CM

## 2024-11-09 DIAGNOSIS — I95.9: ICD-10-CM

## 2024-11-09 DIAGNOSIS — D64.9: ICD-10-CM

## 2024-11-09 DIAGNOSIS — M43.22: ICD-10-CM

## 2024-11-09 DIAGNOSIS — Z79.82: ICD-10-CM

## 2024-11-09 DIAGNOSIS — K62.3: ICD-10-CM

## 2024-11-09 DIAGNOSIS — E03.9: ICD-10-CM

## 2024-11-09 DIAGNOSIS — Z90.49: ICD-10-CM

## 2024-11-09 DIAGNOSIS — K52.9: ICD-10-CM

## 2024-11-09 DIAGNOSIS — F15.10: ICD-10-CM

## 2024-11-09 DIAGNOSIS — Z88.2: ICD-10-CM

## 2024-11-09 DIAGNOSIS — I11.0: ICD-10-CM

## 2024-11-09 DIAGNOSIS — Z79.890: ICD-10-CM
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
## 2024-11-09 DIAGNOSIS — Z88.0: ICD-10-CM

## 2024-11-09 DIAGNOSIS — F12.10: ICD-10-CM

## 2024-11-09 DIAGNOSIS — R57.1: Primary | ICD-10-CM

## 2024-11-09 DIAGNOSIS — E87.6: ICD-10-CM

## 2024-11-09 LAB
ANION GAP SERPL CALC-SCNC: 7.6 MEQ/L (ref 5–15)
ANION GAP SERPL CALC-SCNC: 8.9 MEQ/L (ref 5–15)
BUN BLD-MCNC: 16 MG/DL (ref 7–18)
BUN BLD-MCNC: 9 MG/DL (ref 7–18)
GLUCOSE SERPLBLD-MCNC: 83 MG/DL (ref 74–106)
GLUCOSE SERPLBLD-MCNC: 97 MG/DL (ref 74–106)
HCT VFR BLD CALC: 32.2 % (ref 36–45)
HGB BLD-MCNC: 10.9 G/DL (ref 12–15)
LYMPHOCYTES # SPEC AUTO: 2 K/UL (ref 0.7–4.9)
MCH RBC QN AUTO: 28.6 PG (ref 27–35)
MCHC RBC AUTO-ENTMCNC: 33.7 G/DL (ref 32–36)
MCV RBC: 84.9 FL (ref 80–100)
METHAMPHET UR QL SCN: POSITIVE
NRBC # BLD: 0 10*3/UL (ref 0–0)
NRBC BLD AUTO-RTO: 0.2 % (ref 0–0)
NT-PROBNP SERPL-MCNC: 124 PG/ML (ref ?–125)
PMV BLD: 6.7 FL (ref 7.6–11.3)
POTASSIUM SERPL-SCNC: 2.9 MEQ/L (ref 3.5–5.1)
POTASSIUM SERPL-SCNC: 3.6 MEQ/L (ref 3.5–5.1)
RBC # BLD: 3.8 M/UL (ref 3.86–4.86)
SQUAMOUS URNS QL MICRO: <5 /HPF
THC SERPL-MCNC: POSITIVE NG/ML
TROPONIN I SERPL HS-MCNC: 5 PG/ML (ref ?–58.9)
TSH SERPL DL<=0.05 MIU/L-ACNC: 1.76 UIU/ML (ref 0.36–3.74)
UA COMPLETE W REFLEX CULTURE PNL UR: (no result)
UA COMPLETE W REFLEX CULTURE PNL UR: (no result)
WBC # BLD AUTO: 6.1 THOU/UL (ref 4.3–10.9)

## 2024-11-09 PROCEDURE — 36415 COLL VENOUS BLD VENIPUNCTURE: CPT

## 2024-11-09 PROCEDURE — 36556 INSERT NON-TUNNEL CV CATH: CPT

## 2024-11-09 PROCEDURE — 80307 DRUG TEST PRSMV CHEM ANLYZR: CPT

## 2024-11-09 PROCEDURE — 85027 COMPLETE CBC AUTOMATED: CPT

## 2024-11-09 PROCEDURE — 83735 ASSAY OF MAGNESIUM: CPT

## 2024-11-09 PROCEDURE — 02HV33Z INSERTION OF INFUSION DEVICE INTO SUPERIOR VENA CAVA, PERCUTANEOUS APPROACH: ICD-10-PCS

## 2024-11-09 PROCEDURE — 83605 ASSAY OF LACTIC ACID: CPT

## 2024-11-09 PROCEDURE — 84443 ASSAY THYROID STIM HORMONE: CPT

## 2024-11-09 PROCEDURE — 71045 X-RAY EXAM CHEST 1 VIEW: CPT

## 2024-11-09 PROCEDURE — 87040 BLOOD CULTURE FOR BACTERIA: CPT

## 2024-11-09 PROCEDURE — 85025 COMPLETE CBC W/AUTO DIFF WBC: CPT

## 2024-11-09 PROCEDURE — 81001 URINALYSIS AUTO W/SCOPE: CPT

## 2024-11-09 PROCEDURE — 96372 THER/PROPH/DIAG INJ SC/IM: CPT

## 2024-11-09 PROCEDURE — 74177 CT ABD & PELVIS W/CONTRAST: CPT

## 2024-11-09 PROCEDURE — 84484 ASSAY OF TROPONIN QUANT: CPT

## 2024-11-09 PROCEDURE — 82533 TOTAL CORTISOL: CPT

## 2024-11-09 PROCEDURE — 84100 ASSAY OF PHOSPHORUS: CPT

## 2024-11-09 PROCEDURE — 3E033XZ INTRODUCTION OF VASOPRESSOR INTO PERIPHERAL VEIN, PERCUTANEOUS APPROACH: ICD-10-PCS

## 2024-11-09 PROCEDURE — 83880 ASSAY OF NATRIURETIC PEPTIDE: CPT

## 2024-11-09 PROCEDURE — 0T9B70Z DRAINAGE OF BLADDER WITH DRAINAGE DEVICE, VIA NATURAL OR ARTIFICIAL OPENING: ICD-10-PCS

## 2024-11-09 PROCEDURE — 84439 ASSAY OF FREE THYROXINE: CPT

## 2024-11-09 PROCEDURE — 93005 ELECTROCARDIOGRAM TRACING: CPT

## 2024-11-09 PROCEDURE — 99291 CRITICAL CARE FIRST HOUR: CPT

## 2024-11-09 PROCEDURE — 80053 COMPREHEN METABOLIC PANEL: CPT

## 2024-11-09 PROCEDURE — 80048 BASIC METABOLIC PNL TOTAL CA: CPT

## 2024-11-09 PROCEDURE — 93306 TTE W/DOPPLER COMPLETE: CPT

## 2024-11-09 PROCEDURE — 70450 CT HEAD/BRAIN W/O DYE: CPT

## 2024-11-09 PROCEDURE — 72125 CT NECK SPINE W/O DYE: CPT

## 2024-11-09 RX ADMIN — METRONIDAZOLE SCH MLS: 500 INJECTION, SOLUTION INTRAVENOUS at 17:24

## 2024-11-09 RX ADMIN — ATORVASTATIN CALCIUM SCH MG: 40 TABLET, FILM COATED ORAL at 20:13

## 2024-11-09 RX ADMIN — CIPROFLOXACIN SCH MLS: 2 INJECTION, SOLUTION INTRAVENOUS at 17:25

## 2024-11-09 RX ADMIN — HEPARIN SODIUM SCH UNIT: 5000 INJECTION, SOLUTION INTRAVENOUS; SUBCUTANEOUS at 09:00

## 2024-11-09 RX ADMIN — MIDODRINE HYDROCHLORIDE SCH MG: 5 TABLET ORAL at 20:09

## 2024-11-09 RX ADMIN — TOPIRAMATE SCH MG: 25 TABLET, COATED ORAL at 20:43

## 2024-11-09 RX ADMIN — SODIUM CHLORIDE SCH MLS: 0.9 INJECTION, SOLUTION INTRAVENOUS at 09:00

## 2024-11-09 RX ADMIN — ACETAMINOPHEN PRN MG: 325 TABLET ORAL at 15:39

## 2024-11-09 RX ADMIN — GABAPENTIN SCH MG: 300 CAPSULE ORAL at 20:13

## 2024-11-10 VITALS — OXYGEN SATURATION: 98 %

## 2024-11-10 LAB
ALBUMIN SERPL BCP-MCNC: 3 G/DL (ref 3.4–5)
ALBUMIN/GLOB SERPL: 1.3 {RATIO} (ref 1.1–1.8)
ALP SERPL-CCNC: 65 U/L (ref 45–117)
ALT SERPL W P-5'-P-CCNC: 18 U/L (ref 13–56)
ANION GAP SERPL CALC-SCNC: 5.7 MEQ/L (ref 5–15)
ANION GAP SERPL CALC-SCNC: 5.8 MEQ/L (ref 5–15)
ANION GAP SERPL CALC-SCNC: 6.7 MEQ/L (ref 5–15)
AST SERPL W P-5'-P-CCNC: 14 U/L (ref 15–37)
BUN BLD-MCNC: 6 MG/DL (ref 7–18)
BUN BLD-MCNC: 6 MG/DL (ref 7–18)
BUN BLD-MCNC: 7 MG/DL (ref 7–18)
GLOBULIN SER CALC-MCNC: 2.3 G/DL (ref 2.3–3.5)
GLUCOSE SERPLBLD-MCNC: 104 MG/DL (ref 74–106)
GLUCOSE SERPLBLD-MCNC: 105 MG/DL (ref 74–106)
GLUCOSE SERPLBLD-MCNC: 126 MG/DL (ref 74–106)
HCT VFR BLD CALC: 28.5 % (ref 36–45)
HCT VFR BLD CALC: 30.8 % (ref 36–45)
HGB BLD-MCNC: 10 G/DL (ref 12–15)
HGB BLD-MCNC: 9.4 G/DL (ref 12–15)
LYMPHOCYTES # SPEC AUTO: 1.1 K/UL (ref 0.7–4.9)
MAGNESIUM SERPL-MCNC: 1.8 MG/DL (ref 1.6–2.4)
MAGNESIUM SERPL-MCNC: 1.9 MG/DL (ref 1.6–2.4)
MCH RBC QN AUTO: 28 PG (ref 27–35)
MCH RBC QN AUTO: 28.3 PG (ref 27–35)
MCHC RBC AUTO-ENTMCNC: 32.4 G/DL (ref 32–36)
MCHC RBC AUTO-ENTMCNC: 33.2 G/DL (ref 32–36)
MCV RBC: 85.2 FL (ref 80–100)
MCV RBC: 86.4 FL (ref 80–100)
NRBC # BLD: 0 10*3/UL (ref 0–0)
NRBC BLD AUTO-RTO: 0 % (ref 0–0)
PMV BLD: 6.7 FL (ref 7.6–11.3)
PMV BLD: 7.3 FL (ref 7.6–11.3)
POTASSIUM SERPL-SCNC: 3.7 MEQ/L (ref 3.5–5.1)
POTASSIUM SERPL-SCNC: 3.7 MEQ/L (ref 3.5–5.1)
POTASSIUM SERPL-SCNC: 3.8 MEQ/L (ref 3.5–5.1)
RBC # BLD: 3.34 M/UL (ref 3.86–4.86)
RBC # BLD: 3.57 M/UL (ref 3.86–4.86)
TROPONIN I SERPL HS-MCNC: 5.1 PG/ML (ref ?–58.9)
WBC # BLD AUTO: 5.6 THOU/UL (ref 4.3–10.9)
WBC # BLD AUTO: 7.5 THOU/UL (ref 4.3–10.9)

## 2024-11-10 RX ADMIN — FOLIC ACID SCH MG: 1 TABLET ORAL at 08:18

## 2024-11-10 RX ADMIN — HYDROCODONE BITARTRATE AND ACETAMINOPHEN PRN TAB: 5; 325 TABLET ORAL at 11:39

## 2024-11-10 RX ADMIN — DEXTROSE, SODIUM CHLORIDE, AND POTASSIUM CHLORIDE SCH MLS: 5; .45; .15 INJECTION INTRAVENOUS at 14:29

## 2024-11-10 RX ADMIN — CETIRIZINE HYDROCHLORIDE SCH MG: 5 TABLET, FILM COATED ORAL at 08:19

## 2024-11-10 RX ADMIN — SODIUM CHLORIDE ONE: 0.9 INJECTION, SOLUTION INTRAVENOUS at 14:17

## 2024-11-10 RX ADMIN — Medication SCH MG: at 08:18

## 2024-11-10 RX ADMIN — SODIUM CHLORIDE, SODIUM LACTATE, POTASSIUM CHLORIDE, AND CALCIUM CHLORIDE SCH MLS: .6; .31; .03; .02 INJECTION, SOLUTION INTRAVENOUS at 14:29

## 2024-11-10 RX ADMIN — NICARDIPINE HYDROCHLORIDE ONE MLS: 25 INJECTION INTRAVENOUS at 12:59

## 2024-11-10 RX ADMIN — DOCUSATE SODIUM SCH MG: 100 CAPSULE, LIQUID FILLED ORAL at 08:18

## 2024-11-10 RX ADMIN — LEVOTHYROXINE SODIUM SCH MG: 0.05 TABLET ORAL at 06:26

## 2024-11-10 RX ADMIN — ASPIRIN SCH MG: 81 TABLET, COATED ORAL at 08:19

## 2024-11-10 RX ADMIN — NOREPINEPHRINE BITARTRATE ONE MLS: 4 INJECTION, SOLUTION INTRAVENOUS at 16:19

## 2024-11-11 LAB
ANION GAP SERPL CALC-SCNC: 5.8 MEQ/L (ref 5–15)
BUN BLD-MCNC: 6 MG/DL (ref 7–18)
GLUCOSE SERPLBLD-MCNC: 117 MG/DL (ref 74–106)
HCT VFR BLD CALC: 30.1 % (ref 36–45)
HGB BLD-MCNC: 10.2 G/DL (ref 12–15)
LYMPHOCYTES # SPEC AUTO: 1.4 K/UL (ref 0.7–4.9)
MAGNESIUM SERPL-MCNC: 2 MG/DL (ref 1.6–2.4)
MCH RBC QN AUTO: 28.7 PG (ref 27–35)
MCHC RBC AUTO-ENTMCNC: 33.8 G/DL (ref 32–36)
MCV RBC: 85 FL (ref 80–100)
NRBC # BLD: 0 10*3/UL (ref 0–0)
NRBC BLD AUTO-RTO: 0 % (ref 0–0)
PMV BLD: 7.2 FL (ref 7.6–11.3)
POTASSIUM SERPL-SCNC: 3.8 MEQ/L (ref 3.5–5.1)
RBC # BLD: 3.54 M/UL (ref 3.86–4.86)
WBC # BLD AUTO: 6 THOU/UL (ref 4.3–10.9)

## 2024-11-11 RX ADMIN — PSYLLIUM HUSK SCH PKT: 3.4 POWDER ORAL at 08:54

## 2024-11-11 RX ADMIN — Medication ONE MLS: at 06:16

## 2024-11-11 RX ADMIN — DULOXETINE HYDROCHLORIDE SCH MG: 30 CAPSULE, DELAYED RELEASE ORAL at 08:54

## 2024-11-12 VITALS — DIASTOLIC BLOOD PRESSURE: 70 MMHG | SYSTOLIC BLOOD PRESSURE: 109 MMHG

## 2024-11-12 VITALS — TEMPERATURE: 97.7 F

## 2024-11-12 LAB
ANION GAP SERPL CALC-SCNC: 6.8 MEQ/L (ref 5–15)
BUN BLD-MCNC: 3 MG/DL (ref 7–18)
GLUCOSE SERPLBLD-MCNC: 105 MG/DL (ref 74–106)
HCT VFR BLD CALC: 30.2 % (ref 36–45)
HGB BLD-MCNC: 10 G/DL (ref 12–15)
LYMPHOCYTES # SPEC AUTO: 1.2 K/UL (ref 0.7–4.9)
MAGNESIUM SERPL-MCNC: 2 MG/DL (ref 1.6–2.4)
MCH RBC QN AUTO: 28.5 PG (ref 27–35)
MCHC RBC AUTO-ENTMCNC: 33.3 G/DL (ref 32–36)
MCV RBC: 85.8 FL (ref 80–100)
NRBC # BLD: 0 10*3/UL (ref 0–0)
NRBC BLD AUTO-RTO: 0.1 % (ref 0–0)
PMV BLD: 7 FL (ref 7.6–11.3)
POTASSIUM SERPL-SCNC: 3.8 MEQ/L (ref 3.5–5.1)
RBC # BLD: 3.52 M/UL (ref 3.86–4.86)
WBC # BLD AUTO: 4.9 THOU/UL (ref 4.3–10.9)

## 2024-11-12 RX ADMIN — POTASSIUM CHLORIDE SCH: 200 INJECTION, SOLUTION INTRAVENOUS at 07:00
